# Patient Record
Sex: FEMALE | Race: WHITE | NOT HISPANIC OR LATINO | Employment: FULL TIME | URBAN - METROPOLITAN AREA
[De-identification: names, ages, dates, MRNs, and addresses within clinical notes are randomized per-mention and may not be internally consistent; named-entity substitution may affect disease eponyms.]

---

## 2017-02-02 ENCOUNTER — GENERIC CONVERSION - ENCOUNTER (OUTPATIENT)
Dept: OTHER | Facility: OTHER | Age: 32
End: 2017-02-02

## 2017-02-08 ENCOUNTER — ALLSCRIPTS OFFICE VISIT (OUTPATIENT)
Dept: OTHER | Facility: OTHER | Age: 32
End: 2017-02-08

## 2017-02-08 DIAGNOSIS — R35.89 OTHER POLYURIA: ICD-10-CM

## 2017-02-08 DIAGNOSIS — R53.83 OTHER FATIGUE: ICD-10-CM

## 2017-02-13 ENCOUNTER — ALLSCRIPTS OFFICE VISIT (OUTPATIENT)
Dept: OTHER | Facility: OTHER | Age: 32
End: 2017-02-13

## 2017-03-16 ENCOUNTER — ALLSCRIPTS OFFICE VISIT (OUTPATIENT)
Dept: OTHER | Facility: OTHER | Age: 32
End: 2017-03-16

## 2017-05-08 ENCOUNTER — LAB CONVERSION - ENCOUNTER (OUTPATIENT)
Dept: OTHER | Facility: OTHER | Age: 32
End: 2017-05-08

## 2017-05-08 LAB
CORTIS AM PEAK SERPL-SCNC: 14.2 MCG/DL
CREATININE 24 HOUR UR (HISTORICAL): 1.38 G/24 H (ref 0.63–2.5)

## 2017-05-09 ENCOUNTER — LAB CONVERSION - ENCOUNTER (OUTPATIENT)
Dept: OTHER | Facility: OTHER | Age: 32
End: 2017-05-09

## 2017-05-09 LAB
A/G RATIO (HISTORICAL): 1.3 (CALC) (ref 1–2.5)
ALBUMIN SERPL BCP-MCNC: 3.9 G/DL (ref 3.6–5.1)
ALP SERPL-CCNC: 57 U/L (ref 33–115)
ALT SERPL W P-5'-P-CCNC: 15 U/L (ref 6–29)
AST SERPL W P-5'-P-CCNC: 18 U/L (ref 10–30)
BILIRUB SERPL-MCNC: 1 MG/DL (ref 0.2–1.2)
BUN SERPL-MCNC: 10 MG/DL (ref 7–25)
BUN/CREA RATIO (HISTORICAL): NORMAL (CALC) (ref 6–22)
CALCIUM SERPL-MCNC: 8.9 MG/DL (ref 8.6–10.2)
CHLORIDE SERPL-SCNC: 105 MMOL/L (ref 98–110)
CO2 SERPL-SCNC: 23 MMOL/L (ref 20–31)
CREAT SERPL-MCNC: 0.85 MG/DL (ref 0.5–1.1)
EGFR AFRICAN AMERICAN (HISTORICAL): 107 ML/MIN/1.73M2
EGFR-AMERICAN CALC (HISTORICAL): 92 ML/MIN/1.73M2
GAMMA GLOBULIN (HISTORICAL): 2.9 G/DL (CALC) (ref 1.9–3.7)
GLUCOSE (HISTORICAL): 85 MG/DL (ref 65–99)
OSMOLALITY UR: 627 MOSM/KG H2O (ref 50–1400)
OSMOLALITY, SERUM (HISTORICAL): 285 MOSM/KG H2O (ref 275–295)
POTASSIUM SERPL-SCNC: 3.9 MMOL/L (ref 3.5–5.3)
SODIUM SERPL-SCNC: 137 MMOL/L (ref 135–146)
T4 FREE SERPL-MCNC: 1.3 NG/DL (ref 0.8–1.8)
TOTAL PROTEIN (HISTORICAL): 6.8 G/DL (ref 6.1–8.1)
TSH SERPL DL<=0.05 MIU/L-ACNC: 1.1 MIU/L

## 2017-05-11 ENCOUNTER — ALLSCRIPTS OFFICE VISIT (OUTPATIENT)
Dept: OTHER | Facility: OTHER | Age: 32
End: 2017-05-11

## 2017-08-11 ENCOUNTER — ALLSCRIPTS OFFICE VISIT (OUTPATIENT)
Dept: OTHER | Facility: OTHER | Age: 32
End: 2017-08-11

## 2017-08-11 DIAGNOSIS — H55.00 NYSTAGMUS: ICD-10-CM

## 2017-08-11 DIAGNOSIS — H53.9 VISUAL DISTURBANCE: ICD-10-CM

## 2017-08-16 ENCOUNTER — HOSPITAL ENCOUNTER (OUTPATIENT)
Dept: MRI IMAGING | Facility: HOSPITAL | Age: 32
Discharge: HOME | End: 2017-08-16
Payer: COMMERCIAL

## 2017-08-16 DIAGNOSIS — H53.9 VISUAL DISTURBANCE: ICD-10-CM

## 2017-08-16 DIAGNOSIS — H55.00 NYSTAGMUS: ICD-10-CM

## 2017-08-16 PROCEDURE — A9585 GADOBUTROL INJECTION: HCPCS | Performed by: RADIOLOGY

## 2017-08-16 PROCEDURE — 70553 MRI BRAIN STEM W/O & W/DYE: CPT

## 2017-08-16 RX ADMIN — GADOBUTROL 6 ML: 604.72 INJECTION INTRAVENOUS at 08:46

## 2017-08-17 ENCOUNTER — GENERIC CONVERSION - ENCOUNTER (OUTPATIENT)
Dept: OTHER | Facility: OTHER | Age: 32
End: 2017-08-17

## 2017-09-06 ENCOUNTER — ALLSCRIPTS OFFICE VISIT (OUTPATIENT)
Dept: OTHER | Facility: OTHER | Age: 32
End: 2017-09-06

## 2017-10-05 ENCOUNTER — GENERIC CONVERSION - ENCOUNTER (OUTPATIENT)
Dept: OTHER | Facility: OTHER | Age: 32
End: 2017-10-05

## 2017-10-11 ENCOUNTER — LAB REQUISITION (OUTPATIENT)
Dept: LAB | Facility: HOSPITAL | Age: 32
End: 2017-10-11
Payer: COMMERCIAL

## 2017-10-11 ENCOUNTER — GENERIC CONVERSION - ENCOUNTER (OUTPATIENT)
Dept: OTHER | Facility: OTHER | Age: 32
End: 2017-10-11

## 2017-10-11 DIAGNOSIS — R10.2 PELVIC AND PERINEAL PAIN: ICD-10-CM

## 2017-10-11 PROCEDURE — 87480 CANDIDA DNA DIR PROBE: CPT | Performed by: OBSTETRICS & GYNECOLOGY

## 2017-10-11 PROCEDURE — 87510 GARDNER VAG DNA DIR PROBE: CPT | Performed by: OBSTETRICS & GYNECOLOGY

## 2017-10-11 PROCEDURE — 87660 TRICHOMONAS VAGIN DIR PROBE: CPT | Performed by: OBSTETRICS & GYNECOLOGY

## 2017-10-11 PROCEDURE — 87591 N.GONORRHOEAE DNA AMP PROB: CPT | Performed by: OBSTETRICS & GYNECOLOGY

## 2017-10-11 PROCEDURE — 87491 CHLMYD TRACH DNA AMP PROBE: CPT | Performed by: OBSTETRICS & GYNECOLOGY

## 2017-10-12 LAB
CANDIDA RRNA VAG QL PROBE: NEGATIVE
CHLAMYDIA DNA CVX QL NAA+PROBE: ABNORMAL
G VAGINALIS RRNA GENITAL QL PROBE: NEGATIVE
N GONORRHOEA DNA GENITAL QL NAA+PROBE: ABNORMAL
T VAGINALIS RRNA GENITAL QL PROBE: NEGATIVE

## 2017-10-13 ENCOUNTER — GENERIC CONVERSION - ENCOUNTER (OUTPATIENT)
Dept: OTHER | Facility: OTHER | Age: 32
End: 2017-10-13

## 2017-10-16 ENCOUNTER — HOSPITAL ENCOUNTER (OUTPATIENT)
Dept: ULTRASOUND IMAGING | Facility: HOSPITAL | Age: 32
Discharge: HOME | End: 2017-10-16
Payer: COMMERCIAL

## 2017-10-16 DIAGNOSIS — R10.2 PELVIC AND PERINEAL PAIN: ICD-10-CM

## 2017-10-16 PROCEDURE — 76830 TRANSVAGINAL US NON-OB: CPT

## 2017-10-16 PROCEDURE — 76856 US EXAM PELVIC COMPLETE: CPT

## 2017-10-19 ENCOUNTER — HOSPITAL ENCOUNTER (EMERGENCY)
Facility: HOSPITAL | Age: 32
Discharge: HOME | End: 2017-10-20
Attending: EMERGENCY MEDICINE | Admitting: EMERGENCY MEDICINE
Payer: COMMERCIAL

## 2017-10-19 DIAGNOSIS — R10.32 LEFT LOWER QUADRANT ABDOMINAL PAIN OF UNKNOWN ETIOLOGY: Primary | ICD-10-CM

## 2017-10-19 LAB
ALBUMIN SERPL BCP-MCNC: 3.5 G/DL (ref 3.5–5)
ALP SERPL-CCNC: 69 U/L (ref 46–116)
ALT SERPL W P-5'-P-CCNC: 35 U/L (ref 12–78)
ANION GAP SERPL CALCULATED.3IONS-SCNC: 11 MMOL/L (ref 4–13)
AST SERPL W P-5'-P-CCNC: 31 U/L (ref 5–45)
BACTERIA UR QL AUTO: ABNORMAL /HPF
BASOPHILS # BLD AUTO: 0.03 THOUSANDS/ΜL (ref 0–0.1)
BASOPHILS NFR BLD AUTO: 0 % (ref 0–1)
BILIRUB SERPL-MCNC: 0.4 MG/DL (ref 0.2–1)
BILIRUB UR QL STRIP: NEGATIVE
BUN SERPL-MCNC: 14 MG/DL (ref 5–25)
CALCIUM SERPL-MCNC: 8.9 MG/DL (ref 8.3–10.1)
CHLORIDE SERPL-SCNC: 103 MMOL/L (ref 100–108)
CLARITY UR: ABNORMAL
CO2 SERPL-SCNC: 24 MMOL/L (ref 21–32)
COLOR UR: YELLOW
CREAT SERPL-MCNC: 0.83 MG/DL (ref 0.6–1.3)
EOSINOPHIL # BLD AUTO: 0.13 THOUSAND/ΜL (ref 0–0.61)
EOSINOPHIL NFR BLD AUTO: 1 % (ref 0–6)
ERYTHROCYTE [DISTWIDTH] IN BLOOD BY AUTOMATED COUNT: 13.3 % (ref 11.6–15.1)
EXT PREG TEST URINE: NEGATIVE
GFR SERPL CREATININE-BSD FRML MDRD: 95 ML/MIN/1.73SQ M
GLUCOSE SERPL-MCNC: 107 MG/DL (ref 65–140)
GLUCOSE UR STRIP-MCNC: NEGATIVE MG/DL
HCT VFR BLD AUTO: 43.2 % (ref 34.8–46.1)
HGB BLD-MCNC: 14.9 G/DL (ref 11.5–15.4)
HGB UR QL STRIP.AUTO: ABNORMAL
KETONES UR STRIP-MCNC: NEGATIVE MG/DL
LEUKOCYTE ESTERASE UR QL STRIP: NEGATIVE
LIPASE SERPL-CCNC: 243 U/L (ref 73–393)
LYMPHOCYTES # BLD AUTO: 3.81 THOUSANDS/ΜL (ref 0.6–4.47)
LYMPHOCYTES NFR BLD AUTO: 32 % (ref 14–44)
MCH RBC QN AUTO: 31.8 PG (ref 26.8–34.3)
MCHC RBC AUTO-ENTMCNC: 34.5 G/DL (ref 31.4–37.4)
MCV RBC AUTO: 92 FL (ref 82–98)
MONOCYTES # BLD AUTO: 0.57 THOUSAND/ΜL (ref 0.17–1.22)
MONOCYTES NFR BLD AUTO: 5 % (ref 4–12)
MUCOUS THREADS UR QL AUTO: ABNORMAL
NEUTROPHILS # BLD AUTO: 7.4 THOUSANDS/ΜL (ref 1.85–7.62)
NEUTS SEG NFR BLD AUTO: 62 % (ref 43–75)
NITRITE UR QL STRIP: NEGATIVE
NON-SQ EPI CELLS URNS QL MICRO: ABNORMAL /HPF
PH UR STRIP.AUTO: 5.5 [PH] (ref 4.5–8)
PLATELET # BLD AUTO: 287 THOUSANDS/UL (ref 149–390)
PMV BLD AUTO: 10.3 FL (ref 8.9–12.7)
POTASSIUM SERPL-SCNC: 3.2 MMOL/L (ref 3.5–5.3)
PROT SERPL-MCNC: 7 G/DL (ref 6.4–8.2)
PROT UR STRIP-MCNC: NEGATIVE MG/DL
RBC # BLD AUTO: 4.69 MILLION/UL (ref 3.81–5.12)
RBC #/AREA URNS AUTO: ABNORMAL /HPF
SODIUM SERPL-SCNC: 138 MMOL/L (ref 136–145)
SP GR UR STRIP.AUTO: >=1.03 (ref 1–1.03)
UROBILINOGEN UR QL STRIP.AUTO: 0.2 E.U./DL
WBC # BLD AUTO: 11.94 THOUSAND/UL (ref 4.31–10.16)
WBC #/AREA URNS AUTO: ABNORMAL /HPF

## 2017-10-19 PROCEDURE — 85025 COMPLETE CBC W/AUTO DIFF WBC: CPT | Performed by: EMERGENCY MEDICINE

## 2017-10-19 PROCEDURE — 81025 URINE PREGNANCY TEST: CPT | Performed by: EMERGENCY MEDICINE

## 2017-10-19 PROCEDURE — 36415 COLL VENOUS BLD VENIPUNCTURE: CPT

## 2017-10-19 PROCEDURE — 83690 ASSAY OF LIPASE: CPT | Performed by: EMERGENCY MEDICINE

## 2017-10-19 PROCEDURE — 81001 URINALYSIS AUTO W/SCOPE: CPT | Performed by: EMERGENCY MEDICINE

## 2017-10-19 PROCEDURE — 80053 COMPREHEN METABOLIC PANEL: CPT | Performed by: EMERGENCY MEDICINE

## 2017-10-20 ENCOUNTER — APPOINTMENT (EMERGENCY)
Dept: CT IMAGING | Facility: HOSPITAL | Age: 32
End: 2017-10-20
Payer: COMMERCIAL

## 2017-10-20 VITALS
WEIGHT: 152.3 LBS | HEART RATE: 82 BPM | TEMPERATURE: 97.2 F | HEIGHT: 65 IN | RESPIRATION RATE: 18 BRPM | BODY MASS INDEX: 25.37 KG/M2 | SYSTOLIC BLOOD PRESSURE: 128 MMHG | DIASTOLIC BLOOD PRESSURE: 71 MMHG | OXYGEN SATURATION: 98 %

## 2017-10-20 PROCEDURE — 99284 EMERGENCY DEPT VISIT MOD MDM: CPT

## 2017-10-20 PROCEDURE — 96361 HYDRATE IV INFUSION ADD-ON: CPT

## 2017-10-20 PROCEDURE — 74177 CT ABD & PELVIS W/CONTRAST: CPT

## 2017-10-20 PROCEDURE — 96374 THER/PROPH/DIAG INJ IV PUSH: CPT

## 2017-10-20 PROCEDURE — 96375 TX/PRO/DX INJ NEW DRUG ADDON: CPT

## 2017-10-20 RX ORDER — ONDANSETRON 2 MG/ML
4 INJECTION INTRAMUSCULAR; INTRAVENOUS ONCE
Status: COMPLETED | OUTPATIENT
Start: 2017-10-20 | End: 2017-10-20

## 2017-10-20 RX ORDER — NAPROXEN 500 MG/1
500 TABLET ORAL 2 TIMES DAILY PRN
Qty: 30 TABLET | Refills: 0 | Status: SHIPPED | OUTPATIENT
Start: 2017-10-20 | End: 2018-04-15 | Stop reason: ALTCHOICE

## 2017-10-20 RX ORDER — KETOROLAC TROMETHAMINE 30 MG/ML
10 INJECTION, SOLUTION INTRAMUSCULAR; INTRAVENOUS ONCE
Status: COMPLETED | OUTPATIENT
Start: 2017-10-20 | End: 2017-10-20

## 2017-10-20 RX ADMIN — ONDANSETRON 4 MG: 2 INJECTION INTRAMUSCULAR; INTRAVENOUS at 00:38

## 2017-10-20 RX ADMIN — IOHEXOL 100 ML: 350 INJECTION, SOLUTION INTRAVENOUS at 01:00

## 2017-10-20 RX ADMIN — SODIUM CHLORIDE 1000 ML: 0.9 INJECTION, SOLUTION INTRAVENOUS at 00:38

## 2017-10-20 RX ADMIN — KETOROLAC TROMETHAMINE 9.9 MG: 30 INJECTION, SOLUTION INTRAMUSCULAR at 00:37

## 2017-10-20 NOTE — ED PROVIDER NOTES
History  Chief Complaint   Patient presents with    Abdominal Pain     Pt has had ongoing LLQ and has been following up with OBGYN  2 weeks ago pt states she had merena placed, friday started tx for clamydia and monday had pelvic US  Pt became concerned when vaginal discharged changed colors today  History provided by:  Patient and parent  Abdominal Pain   Pain location:  LLQ  Pain quality: cramping    Pain radiates to:  L leg  Pain severity:  Moderate  Onset quality:  Gradual  Duration:  14 days  Timing:  Intermittent  Progression:  Waxing and waning  Chronicity:  New  Context: recent illness    Context: not alcohol use, not awakening from sleep, not diet changes, not eating, not previous surgeries, not retching, not sick contacts, not suspicious food intake and not trauma    Relieved by:  Nothing  Worsened by:  Nothing  Ineffective treatments:  None tried  Associated symptoms: constipation, nausea and vaginal discharge (Reports scant white vaginal discharge beginning today )    Associated symptoms: no anorexia, no chest pain, no chills, no cough, no diarrhea, no dysuria, no fatigue, no fever, no hematuria, no shortness of breath, no vaginal bleeding and no vomiting    Risk factors: has not had multiple surgeries (No hx GI surgery  Has had previous LEAP surgery  No hx STI ), no NSAID use, not obese, not pregnant and no recent hospitalization    Risk factors comment:  IUD placed 14d prior by OB-gyn; treated by Ob-gyn 7d prior with azithromycin x1 dose d/t positive chlamydia test  Had pelvic US on 16 Oct d/t ongoing LLQ abdominal pain that was wnl  DDx including but not limited to: appendicitis, gastroenteritis, gastritis, PUD, GERD, hepatitis, pancreatitis, colitis, enteritis, diverticulitis, food poisoning, mesenteric adenitis, mesenteric ischemia, IBD, IBS, ileus, bowel obstruction, volvulus, cholecystitis, biliary colic, renal colic, pyelonephritis, UTI   Labs drawn prior to my evaluation are unremakrable apart from mildly elevated WBC count; as patient has had normal pelvic US 4d prior in setting of identical sx, my concern for pelvic pathology is relatively lower and concern for primary GI pathology higher  Pelvic ddx includes PID/TOA/IUD malposition/procedure-related uterine irritation  Will obtain CT a/p for any GI abnormality and give symptomatic therapy while workup ongoing  Prior to Admission Medications   Prescriptions Last Dose Informant Patient Reported? Taking?   levonorgestrel (MIRENA) 20 MCG/24HR IUD   Yes Yes   Si each by Intrauterine route once      Facility-Administered Medications: None       Past Medical History:   Diagnosis Date    Irritable bowel        Past Surgical History:   Procedure Laterality Date    CERVICAL BIOPSY  W/ LOOP ELECTRODE EXCISION         History reviewed  No pertinent family history  I have reviewed and agree with the history as documented  Social History   Substance Use Topics    Smoking status: Current Every Day Smoker    Smokeless tobacco: Never Used    Alcohol use Yes      Comment: ocassional        Review of Systems   Constitutional: Negative for chills, fatigue and fever  Respiratory: Negative for cough and shortness of breath  Cardiovascular: Negative for chest pain and palpitations  Gastrointestinal: Positive for abdominal pain, constipation and nausea  Negative for anorexia, diarrhea and vomiting  Genitourinary: Positive for vaginal discharge (Reports scant white vaginal discharge beginning today  )  Negative for difficulty urinating, dysuria, flank pain, frequency, hematuria, urgency and vaginal bleeding  Skin: Negative for color change, pallor, rash and wound  Neurological: Negative for dizziness, weakness, numbness and headaches  Hematological: Negative for adenopathy  Does not bruise/bleed easily  All other systems reviewed and are negative        Physical Exam  ED Triage Vitals [10/19/17 2223]   Temperature Pulse Respirations Blood Pressure SpO2   (!) 97 2 °F (36 2 °C) 83 18 132/71 97 %      Temp Source Heart Rate Source Patient Position - Orthostatic VS BP Location FiO2 (%)   Temporal Monitor Sitting Left arm --      Pain Score       3           Physical Exam   Constitutional: She is oriented to person, place, and time  She appears well-developed and well-nourished  She is cooperative  No distress  HENT:   Head: Normocephalic and atraumatic  Right Ear: Hearing and external ear normal    Left Ear: Hearing and external ear normal    Nose: Nose normal    Mouth/Throat: Uvula is midline, oropharynx is clear and moist and mucous membranes are normal  No oropharyngeal exudate  Eyes: Conjunctivae, EOM and lids are normal  Pupils are equal, round, and reactive to light  Neck: Trachea normal, normal range of motion and phonation normal  Neck supple  No JVD present  No tracheal tenderness, no spinous process tenderness and no muscular tenderness present  No tracheal deviation present  No thyroid mass and no thyromegaly present  Cardiovascular: Normal rate, regular rhythm, S1 normal, S2 normal, normal heart sounds and intact distal pulses  Exam reveals no gallop and no friction rub  No murmur heard  Pulses:       Radial pulses are 2+ on the right side, and 2+ on the left side  Dorsalis pedis pulses are 2+ on the right side, and 2+ on the left side  Posterior tibial pulses are 2+ on the right side, and 2+ on the left side  Pulmonary/Chest: Effort normal and breath sounds normal  No stridor  No respiratory distress  She has no decreased breath sounds  She has no wheezes  She has no rhonchi  She has no rales  She exhibits no tenderness  Abdominal: Soft  She exhibits no distension and no mass  There is no tenderness  There is no rigidity, no rebound, no guarding and no CVA tenderness  Genitourinary: Uterus normal  Pelvic exam was performed with patient supine   There is no rash, tenderness, lesion or injury on the right labia  There is no rash, tenderness, lesion or injury on the left labia  Cervix exhibits no motion tenderness (IUD string visible projecting from cervix), no discharge and no friability  Right adnexum displays no mass, no tenderness and no fullness  Left adnexum displays no mass, no tenderness and no fullness  There is bleeding (Scant dried blood in upper vaginal vault  There is no active bleeding present from cervix or any other portion of vagina ) in the vagina  No erythema or tenderness in the vagina  No foreign body in the vagina  No signs of injury around the vagina  No vaginal discharge found  Genitourinary Comments: Exam assisted by Thuy Ty RN   Musculoskeletal: Normal range of motion  She exhibits no edema, tenderness or deformity  Lymphadenopathy:     She has no cervical adenopathy  Neurological: She is alert and oriented to person, place, and time  She has normal strength  No cranial nerve deficit or sensory deficit  She exhibits normal muscle tone  GCS eye subscore is 4  GCS verbal subscore is 5  GCS motor subscore is 6  PERRLA; EOMI  Sensation intact to light touch over face in V1-V3 distribution bilaterally  Facial expressions symmetric  Tongue/uvula midline  Shoulder shrug equal bilaterally  Strength 5/5 in UE/LE bilaterally  Sensation intact to light touch in UE/LE bilaterally  Skin: Skin is warm, dry and intact  No rash noted  No erythema  Psychiatric: She has a normal mood and affect  Her speech is normal and behavior is normal    Nursing note and vitals reviewed        ED Medications  Medications   ketorolac (TORADOL) 30 mg/mL injection 9 9 mg (9 9 mg Intravenous Given 10/20/17 0037)   sodium chloride 0 9 % bolus 1,000 mL (0 mL Intravenous Stopped 10/20/17 0157)   ondansetron (ZOFRAN) injection 4 mg (4 mg Intravenous Given 10/20/17 0038)   iohexol (OMNIPAQUE) 350 MG/ML injection (SINGLE-DOSE) 100 mL (100 mL Intravenous Given 10/20/17 0100)       Diagnostic Studies  Labs Reviewed   UA W REFLEX TO MICROSCOPIC WITH REFLEX TO CULTURE - Abnormal        Result Value Ref Range Status    Blood, UA Moderate (*) Negative, Trace-Intact Final    Color, UA Yellow   Final    Clarity, UA Slightly Cloudy   Final    Specific Gravity, UA >=1 030  1 003 - 1 030 Final    pH, UA 5 5  4 5 - 8 0 Final    Leukocytes, UA Negative  Negative Final    Nitrite, UA Negative  Negative Final    Protein, UA Negative  Negative mg/dl Final    Glucose, UA Negative  Negative mg/dl Final    Ketones, UA Negative  Negative mg/dl Final    Urobilinogen, UA 0 2  0 2, 1 0 E U /dl E U /dl Final    Bilirubin, UA Negative  Negative Final   CBC AND DIFFERENTIAL - Abnormal     WBC 11 94 (*) 4 31 - 10 16 Thousand/uL Final    RBC 4 69  3 81 - 5 12 Million/uL Final    Hemoglobin 14 9  11 5 - 15 4 g/dL Final    Hematocrit 43 2  34 8 - 46 1 % Final    MCV 92  82 - 98 fL Final    MCH 31 8  26 8 - 34 3 pg Final    MCHC 34 5  31 4 - 37 4 g/dL Final    RDW 13 3  11 6 - 15 1 % Final    MPV 10 3  8 9 - 12 7 fL Final    Platelets 696  251 - 390 Thousands/uL Final    Neutrophils Relative 62  43 - 75 % Final    Lymphocytes Relative 32  14 - 44 % Final    Monocytes Relative 5  4 - 12 % Final    Eosinophils Relative 1  0 - 6 % Final    Basophils Relative 0  0 - 1 % Final    Neutrophils Absolute 7 40  1 85 - 7 62 Thousands/µL Final    Lymphocytes Absolute 3 81  0 60 - 4 47 Thousands/µL Final    Monocytes Absolute 0 57  0 17 - 1 22 Thousand/µL Final    Eosinophils Absolute 0 13  0 00 - 0 61 Thousand/µL Final    Basophils Absolute 0 03  0 00 - 0 10 Thousands/µL Final   COMPREHENSIVE METABOLIC PANEL - Abnormal     Potassium 3 2 (*) 3 5 - 5 3 mmol/L Final    Sodium 138  136 - 145 mmol/L Final    Chloride 103  100 - 108 mmol/L Final    CO2 24  21 - 32 mmol/L Final    Anion Gap 11  4 - 13 mmol/L Final    BUN 14  5 - 25 mg/dL Final    Creatinine 0 83  0 60 - 1 30 mg/dL Final    Comment: Standardized to IDMS reference method    Glucose 107 65 - 140 mg/dL Final    Comment:   If the patient is fasting, the ADA then defines impaired fasting glucose as > 100 mg/dL and diabetes as > or equal to 123 mg/dL  Specimen collection should occur prior to Sulfasalazine administration due to the potential for falsely depressed results  Specimen collection should occur prior to Sulfapyridine administration due to the potential for falsely elevated results  Calcium 8 9  8 3 - 10 1 mg/dL Final    AST 31  5 - 45 U/L Final    Comment:   Specimen collection should occur prior to Sulfasalazine administration due to the potential for falsely depressed results  ALT 35  12 - 78 U/L Final    Comment:   Specimen collection should occur prior to Sulfasalazine administration due to the potential for falsely depressed results  Alkaline Phosphatase 69  46 - 116 U/L Final    Total Protein 7 0  6 4 - 8 2 g/dL Final    Albumin 3 5  3 5 - 5 0 g/dL Final    Total Bilirubin 0 40  0 20 - 1 00 mg/dL Final    eGFR 95  ml/min/1 73sq m Final    Narrative:     National Kidney Disease Education Program recommendations are as follows:  GFR calculation is accurate only with a steady state creatinine  Chronic Kidney disease less than 60 ml/min/1 73 sq  meters  Kidney failure less than 15 ml/min/1 73 sq  meters  URINE MICROSCOPIC - Abnormal     RBC, UA 4-10 (*) None Seen, 0-5 /hpf Final    Epithelial Cells Moderate (*) None Seen, Occasional /hpf Final    Bacteria, UA Moderate (*) None Seen, Occasional /hpf Final    WBC, UA None Seen  None Seen, 0-5, 5-55, 5-65 /hpf Final    MUCOUS THREADS Moderate  Occasional, Moderate, Innumerable Final   LIPASE - Normal    Lipase 243  73 - 393 u/L Final   POCT PREGNANCY, URINE - Normal    EXT PREG TEST UR (Ref: Negative) negative   Final       CT abdomen pelvis with contrast   ED Interpretation   Reviewed virtual radiology report:      IMPRESSION:   1  Right adnexal cyst redemonstrated  2  Trace free pelvic fluid     3  No other acute abnormality identified  Procedures  Procedures      Phone Contacts  ED Phone Contact    ED Course  ED Course as of Oct 20 0526   Fri Oct 20, 2017   0113 1  WBC minimally elevated  2  Hg/Hct wnl   3  Plt wnl   4  Electrolytes wnl  LFTs wnl   5  Lipase wnl   6  UA shows moderate blood; no other abnormalities present  7  UPT negative  CT completed and awaiting interpretation at this time  MDM  Number of Diagnoses or Management Options  Left lower quadrant abdominal pain of unknown etiology: new and requires workup     Amount and/or Complexity of Data Reviewed  Clinical lab tests: reviewed and ordered  Tests in the radiology section of CPT®: ordered and reviewed  Decide to obtain previous medical records or to obtain history from someone other than the patient: yes  Obtain history from someone other than the patient: yes (Patient's mother accompanies her to ED)  Review and summarize past medical records: yes  Independent visualization of images, tracings, or specimens: yes    Risk of Complications, Morbidity, and/or Mortality  Presenting problems: high  Diagnostic procedures: high  Management options: moderate  General comments: CT scan unremarkable as to etiology of patient's symptoms; there is a right-sided adnexal structure reported when compared to previous pelvic US of 16 October  I reviewed the previous US, however, and no adnexal structure is present on the right (there is a dominant right-sided follicle noted)  This is not consistent with location of patient's sx regardless  IUD is present in appropriate position regardless  Pelvic exam not c/w continued PID or cervicitis  Encouraged patient to follow with primary physician for further evaluation  NSAID for symptomatic therapy in the interval  All questions answered prior to discharge  The patient and her mother expressed understanding and agreed to plan      Patient Progress  Patient progress: improved    CritCare Time    Disposition  Final diagnoses: Left lower quadrant abdominal pain of unknown etiology     ED Disposition     ED Disposition Condition Comment    Discharge  Doreen Wesley discharge to home/self care  Condition at discharge: Good        Follow-up Information     Follow up With Specialties Details Why [address],  Family Medicine Call today For an appointment for further evaluation [address] [address]  [address], [facility] [address] [phone]          Discharge Medication List as of 10/20/2017  1:55 AM      START taking these medications    Details   naproxen (NAPROSYN) 500 mg tablet Take 1 tablet by mouth 2 (two) times a day as needed for mild pain, Starting Fri 10/20/2017, Print         CONTINUE these medications which have NOT CHANGED    Details   levonorgestrel (MIRENA) 20 MCG/24HR IUD 1 each by Intrauterine route once, Historical Med           No discharge procedures on file      ED Provider  Electronically Signed by       Marieta Osgood, DO  10/20/17 5006

## 2017-10-20 NOTE — DISCHARGE INSTRUCTIONS
Abdominal Pain   WHAT YOU NEED TO KNOW:   Abdominal pain can be dull, achy, or sharp  You may have pain in one area of your abdomen, or in your entire abdomen  Your pain may be caused by a condition such as constipation, food sensitivity or poisoning, infection, or a blockage  Abdominal pain can also be from a hernia, appendicitis, or an ulcer  Liver, gallbladder, or kidney conditions can also cause abdominal pain  The cause of your abdominal pain may be unknown  DISCHARGE INSTRUCTIONS:   Return to the emergency department if:   · You have new chest pain or shortness of breath  · You have pulsing pain in your upper abdomen or lower back that suddenly becomes constant  · Your pain is in the right lower abdominal area and worsens with movement  · You have a fever over 100 4°F (38°C) or shaking chills  · You are vomiting and cannot keep food or liquids down  · Your pain does not improve or gets worse over the next 8 to 12 hours  · You see blood in your vomit or bowel movements, or they look black and tarry  · Your skin or the whites of your eyes turn yellow  · You are a woman and have a large amount of vaginal bleeding that is not your monthly period  Contact your healthcare provider if:   · You have pain in your lower back  · You are a man and have pain in your testicles  · You have pain when you urinate  · You have questions or concerns about your condition or care  Follow up with your healthcare provider within 24 hours or as directed:  Write down your questions so you remember to ask them during your visits  Medicines:   · Medicines  may be given to calm your stomach and prevent vomiting or to decrease pain  Ask how to take pain medicine safely  · Take your medicine as directed  Contact your healthcare provider if you think your medicine is not helping or if you have side effects  Tell him of her if you are allergic to any medicine   Keep a list of the medicines, vitamins, and herbs you take  Include the amounts, and when and why you take them  Bring the list or the pill bottles to follow-up visits  Carry your medicine list with you in case of an emergency  © 2017 [employer] [employer] Information is for End User's use only and may not be sold, redistributed or otherwise used for commercial purposes  All illustrations and images included in CareNotes® are the copyrighted property of A D A M , Inc  or Nhan Carver  The above information is an  only  It is not intended as medical advice for individual conditions or treatments  Talk to your doctor, nurse or pharmacist before following any medical regimen to see if it is safe and effective for you

## 2017-10-30 ENCOUNTER — GENERIC CONVERSION - ENCOUNTER (OUTPATIENT)
Dept: OTHER | Facility: OTHER | Age: 32
End: 2017-10-30

## 2017-11-01 ENCOUNTER — GENERIC CONVERSION - ENCOUNTER (OUTPATIENT)
Dept: OTHER | Facility: OTHER | Age: 32
End: 2017-11-01

## 2017-11-01 ENCOUNTER — LAB REQUISITION (OUTPATIENT)
Dept: LAB | Facility: HOSPITAL | Age: 32
End: 2017-11-01
Payer: COMMERCIAL

## 2017-11-01 DIAGNOSIS — N89.8 OTHER SPECIFIED NONINFLAMMATORY DISORDERS OF VAGINA (CODE): ICD-10-CM

## 2017-11-01 DIAGNOSIS — A56.00 INFECTION OF LOWER GENITOURINARY TRACT DUE TO CHLAMYDIA: ICD-10-CM

## 2017-11-01 PROCEDURE — 87660 TRICHOMONAS VAGIN DIR PROBE: CPT | Performed by: PHYSICIAN ASSISTANT

## 2017-11-01 PROCEDURE — 87510 GARDNER VAG DNA DIR PROBE: CPT | Performed by: PHYSICIAN ASSISTANT

## 2017-11-01 PROCEDURE — 87480 CANDIDA DNA DIR PROBE: CPT | Performed by: PHYSICIAN ASSISTANT

## 2017-11-01 PROCEDURE — 87591 N.GONORRHOEAE DNA AMP PROB: CPT | Performed by: PHYSICIAN ASSISTANT

## 2017-11-01 PROCEDURE — 87491 CHLMYD TRACH DNA AMP PROBE: CPT | Performed by: PHYSICIAN ASSISTANT

## 2017-11-03 LAB
CHLAMYDIA DNA CVX QL NAA+PROBE: NORMAL
N GONORRHOEA DNA GENITAL QL NAA+PROBE: NORMAL

## 2017-11-04 LAB
CANDIDA RRNA VAG QL PROBE: NEGATIVE
G VAGINALIS RRNA GENITAL QL PROBE: NEGATIVE
T VAGINALIS RRNA GENITAL QL PROBE: NEGATIVE

## 2017-11-06 ENCOUNTER — GENERIC CONVERSION - ENCOUNTER (OUTPATIENT)
Dept: OTHER | Facility: OTHER | Age: 32
End: 2017-11-06

## 2017-11-17 ENCOUNTER — GENERIC CONVERSION - ENCOUNTER (OUTPATIENT)
Dept: OTHER | Facility: OTHER | Age: 32
End: 2017-11-17

## 2017-11-30 ENCOUNTER — LAB REQUISITION (OUTPATIENT)
Dept: LAB | Facility: HOSPITAL | Age: 32
End: 2017-11-30
Payer: COMMERCIAL

## 2017-11-30 ENCOUNTER — GENERIC CONVERSION - ENCOUNTER (OUTPATIENT)
Dept: OTHER | Facility: OTHER | Age: 32
End: 2017-11-30

## 2017-11-30 DIAGNOSIS — N89.8 OTHER SPECIFIED NONINFLAMMATORY DISORDERS OF VAGINA (CODE): ICD-10-CM

## 2017-11-30 PROCEDURE — 87510 GARDNER VAG DNA DIR PROBE: CPT | Performed by: PHYSICIAN ASSISTANT

## 2017-11-30 PROCEDURE — 87660 TRICHOMONAS VAGIN DIR PROBE: CPT | Performed by: PHYSICIAN ASSISTANT

## 2017-11-30 PROCEDURE — 87480 CANDIDA DNA DIR PROBE: CPT | Performed by: PHYSICIAN ASSISTANT

## 2018-01-10 NOTE — MISCELLANEOUS
Message   Recorded as Task   Date: 10/13/2017 12:14 PM, Created By: Tanesha Wynn   Task Name: Care Coordination   Assigned To: Oc Lynn   Regarding Patient: Katey Mcgovern, Status: In Progress   Comment:    Soila Sosa - 13 Oct 2017 12:14 PM     TASK CREATED  Can you let Krystin Ellis know that unfortunately her chlamydia test was positive  She'll need 1g azithromycin sent to her pharmacy  The rest of her testing was negative  Vickie Blake - 13 Oct 2017 12:54 PM     TASK IN PROGRESS   Vickie Blake - 13 Oct 2017 1:40 PM     TASK EDITED  Phone call made to Pt today  Pt informed that Dr Duglas Simon reviewed Pt's recent test results, Pt's chlamydia test was positive, BV test negative  Prescription for Azithromycin 250 mg oral tablet, 4 tablets  0 refill completed in [employer] by Dr Duglas Simon  Prescription forwarded to pharmacy in Pt's EHR  Pt informed that sexual partner must be treated as well in order to prevent re-infection  Pt further informed that she should abstain from sex for at least two weeks while she and partner undergo treatment  Pt informed that a "test of cure" second test will need to be done 3 weeks post treatment  Reiterated to Pt that if she has any questions/concerns, to contact office  Active Problems    1  Chlamydial infection of lower genitourinary tract (099 53) (A56 00)   2  Encounter for general counseling and advice on contraceptive management (V25 09)   (Z30 09)   3  Encounter for insertion of mirena IUD (V25 11) (Z30 430)   4  Nystagmus (379 50) (H55 00)   5  Pelvic pain in female (625 9) (R10 2)   6  Tobacco use (305 1) (Z72 0)   7  Vaginal discharge (623 5) (N89 8)   8  Vision changes (368 9) (H53 9)    Current Meds   1  Azithromycin 250 MG Oral Tablet; TAKE 4  TABLET Oral;   Therapy: [id number] to (Last Rx:13Oct2017)  Requested for: [id number] Ordered   2  Chantix Continuing Month Marlon 1 MG Oral Tablet; TAKE 1 TABLET TWICE DAILY;    Therapy: [id number] to (EHIZCK:[id number])  Requested for: 11Aug2017; Last   Rx:11Aug2017 Ordered   3  Chantix Starting Month Marlon 0 5 MG X 11 & 1 MG X 42 Oral Tablet; TAKE AS DIRECTED   PER PACKAGE INSTRUCTIONS; Therapy: 11Aug2017 to (Last Rx:11Aug2017)  Requested for: 11Aug2017 Ordered    Allergies    1   No Known Drug Allergies    Signatures   Electronically signed by : Louis Vincent MA; Oct 13 2017  1:40PM EST                       (Author)

## 2018-01-11 NOTE — MISCELLANEOUS
Message   Recorded as Task   Date: 10/13/2017 12:14 PM, Created By: [address]   Task Name: Care Coordination   Assigned To: Jeanne Padron   Regarding Patient: Sebastián Bang, Status: In Progress   Comment:    Soila Sosa - 13 Oct 2017 12:14 PM     TASK CREATED  Can you let Juan Chery know that unfortunately her chlamydia test was positive  She'll need 1g azithromycin sent to her pharmacy  The rest of her testing was negative  Vickie Blake - 13 Oct 2017 12:54 PM     TASK IN PROGRESS   Vickie Blake - 13 Oct 2017 1:40 PM     TASK EDITED  Phone call made to Pt today  Pt informed that Dr Kaelyn Hernandez reviewed Pt's recent test results, Pt's chlamydia test was positive, BV test negative  Prescription for Azithromycin 250 mg oral tablet, 4 tablets  0 refill completed in [employer] by Dr Kaelyn Hernandez  Prescription forwarded to pharmacy in Pt's EHR  Pt informed that sexual partner must be treated as well in order to prevent re-infection  Pt further informed that she should abstain from sex for at least two weeks while she and partner undergo treatment  Pt informed that a "test of cure" second test will need to be done 3 weeks post treatment  Reiterated to Pt that if she has any questions/concerns, to contact office  Vickie Blake - 13 Oct 2017 1:42 PM     TASK EDITED   Pamela Ruiz - 17 Nov 2017 8:05 AM     TASK IN PROGRESS        Active Problems    1  History of Chlamydial infection of lower genitourinary tract (099 53) (A56 00)   2  Encounter for general counseling and advice on contraceptive management (V25 09)   (Z30 09)   3  Encounter for insertion of mirena IUD (V25 11) (Z30 430)   4  IUD (intrauterine device) in place (V45 51) (Z97 5)   5  Nystagmus (379 50) (H55 00)   6  Pelvic pain in female (625 9) (R10 2)   7  Tobacco use (305 1) (Z72 0)   8  Vaginal discharge (623 5) (N89 8)   9  Vision changes (368 9) (H53 9)    Current Meds   1   Chantix Continuing Month Marlon 1 MG Oral Tablet; TAKE 1 TABLET TWICE DAILY; Therapy: 11Aug2017 to (Evaluate:26Jan2018)  Requested for: 11Aug2017; Last   Rx:11Aug2017 Ordered   2  Chantix Starting Month Marlon 0 5 MG X 11 & 1 MG X 42 Oral Tablet; TAKE AS DIRECTED   PER PACKAGE INSTRUCTIONS; Therapy: 11Aug2017 to (Last Rx:11Aug2017)  Requested for: 11Aug2017 Ordered   3  MetroNIDAZOLE 500 MG Oral Tablet (Flagyl); Take 1 capsule twice daily; Therapy: [id number] to (Bib Timmons)  Requested for: [id number]; Last   Rx:30Oct2017 Ordered    Allergies    1   No Known Drug Allergies    Signatures   Electronically signed by : Nathanael Sykes, ; Nov 17 2017  8:06AM EST                       (Author)

## 2018-01-12 VITALS
WEIGHT: 145.44 LBS | HEIGHT: 64 IN | HEART RATE: 76 BPM | BODY MASS INDEX: 24.83 KG/M2 | DIASTOLIC BLOOD PRESSURE: 66 MMHG | SYSTOLIC BLOOD PRESSURE: 110 MMHG

## 2018-01-12 NOTE — MISCELLANEOUS
Message   Recorded as Task   Date: 02/01/2017 05:38 PM, Created By: Lidia Roth   Task Name: Med Renewal Request   Assigned To: Leonel Mckeon   Regarding Patient: Shyam Lee, Status: Active   CommentConstantine Furlong - 01 Feb 2017 5:38 PM     TASK CREATED  Caller: Self; Renew Medication; [phone] (Home); [phone] (Work)  Patient needs a refill before her  depo on 2/13/17  She wants it to go to a new pharmacy  [facility] [address] in [address] [place] [address] Feb 2017 7:37 AM     TASK REASSIGNED: Previously Assigned To James Toussaint - 88 Feb 2017 8:01 AM     TASK EDITED  rx to pharm        Active Problems    1  Acute conjunctivitis of right eye (372 00) (H10 31)   2  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)   3  Encounter for gynecological examination without abnormal finding (V72 31) (Z01 419)   4  Hypotension (458 9) (I95 9)   5  Orthostasis (458 0) (I95 1)   6  Palpitations (785 1) (R00 2)   7  Pelvic pain in female (625 9) (R10 2)    Current Meds   1  Desmopressin Acetate 0 1 MG Oral Tablet; TAKE 1 TABLET ONCE DAILY; Therapy: [id number] to (Evaluate:26Jan2017)  Requested for: 97Wxd1277; Last   Rx:[id number] Ordered   2  MedroxyPROGESTERone Acetate 150 MG/ML Intramuscular Suspension; INJECT   EVERY 12 WEEKS AS DIRECTED; Therapy: [id number] to (Last Rx:16Nov2016)  Requested for: [id number] Ordered    Allergies    1   No Known Drug Allergies    Plan  Encounter for gynecological examination without abnormal finding    · MedroxyPROGESTERone Acetate 150 MG/ML Intramuscular Suspension  (Depo-Provera); INJECT EVERY 12 WEEKS AS DIRECTED    Signatures   Electronically signed by : Atif Epps, ; Feb 2 2017  8:01AM EST                       (Author)

## 2018-01-13 VITALS — SYSTOLIC BLOOD PRESSURE: 98 MMHG | DIASTOLIC BLOOD PRESSURE: 54 MMHG | HEART RATE: 80 BPM

## 2018-01-13 VITALS
HEIGHT: 64 IN | WEIGHT: 145.5 LBS | SYSTOLIC BLOOD PRESSURE: 98 MMHG | DIASTOLIC BLOOD PRESSURE: 58 MMHG | BODY MASS INDEX: 24.84 KG/M2

## 2018-01-13 NOTE — RESULT NOTES
Verified Results  * MRI BRAIN [facility] CONTRAST [id number] 06:48AM Michelle Pate Order Number: [id number]    - Patient Instructions: To schedule this appointment, please contact Central Scheduling at [phone]  Test Name Result Flag Reference   MRI BRAIN W WO CONTRAST (Report)     This is a summary report  The complete report is available in the patient's medical record  If you cannot access the medical record, please contact the sending organization for a detailed fax or copy  MRI BRAIN WITH AND WITHOUT CONTRAST     INDICATION: H55 00: Unspecified nystagmus   H53 9: Unspecified visual disturbance  History taken directly from the electronic ordering system  bilat occasional eye spasms and flashes of light left eye symptoms x 2 months     COMPARISON: None  TECHNIQUE:   Sagittal T1, axial T2, axial FLAIR, axial T1, axial Magdalena, axial diffusion  Sagittal, axial and coronal T1 postcontrast  Axial BRAVO post contrast       IV Contrast: 6 mL of Gadobutrol injection (SINGLE-DOSE)       IMAGE QUALITY:  Diagnostic  FINDINGS:     BRAIN PARENCHYMA: There is no discrete mass, mass effect or midline shift  No abnormal white matter signal identified  Brainstem and cerebellum demonstrate normal signal  There is no intracranial hemorrhage  There is no evidence of acute infarction and   diffusion imaging is unremarkable  Postcontrast imaging of the brain demonstrates no abnormal enhancement  Incidental note is made of a 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, a variant of normal parenchymal venous    drainage  No associated cavernous angioma detected  VENTRICLES: Normal      SELLA AND PITUITARY GLAND: Normal      ORBITS: Normal      PARANASAL SINUSES: Normal      VASCULATURE: Evaluation of the major intracranial vasculature demonstrates appropriate flow voids       CALVARIUM AND SKULL BASE: Normal      EXTRACRANIAL SOFT TISSUES: Normal        IMPRESSION:     No acute infarction, intracranial hemorrhage or mass  Workstation performed: [id number]     Signed by:    Efrain Hager MD   8/16/17   23g butterfly lt ac       Plan  Nystagmus    · 1 - Terri Noland MD, Giancarlo Heard  (Neurology) Co-Management  *  Status: Active  Requested for:  [id number]  Care Summary provided  : Yes

## 2018-01-14 VITALS — SYSTOLIC BLOOD PRESSURE: 104 MMHG | WEIGHT: 146 LBS | DIASTOLIC BLOOD PRESSURE: 68 MMHG | BODY MASS INDEX: 25.06 KG/M2

## 2018-01-14 VITALS
BODY MASS INDEX: 24.28 KG/M2 | WEIGHT: 142.25 LBS | DIASTOLIC BLOOD PRESSURE: 62 MMHG | TEMPERATURE: 96.8 F | SYSTOLIC BLOOD PRESSURE: 108 MMHG | HEIGHT: 64 IN

## 2018-01-15 NOTE — PROGRESS NOTES
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)   4  Encounter for gynecological examination without abnormal finding (V72 31) (Z01 419)   5  Fatigue (780 79) (R53 83)   6  Hypotension (458 9) (I95 9)   7  Orthostasis (458 0) (I95 1)   8  Palpitations (785 1) (R00 2)   9  Pelvic pain in female (625 9) (R10 2)   10  Polyuria (788 42) (R35 8)    Current Meds   1  MedroxyPROGESTERone Acetate 150 MG/ML Intramuscular Suspension; INJECT   EVERY 12 WEEKS AS DIRECTED; Therapy: 07WQE2187 to (Last Rx:02Feb2017)  Requested for: 60Hfj6140 Ordered    Allergies    1  No Known Drug Allergies    Vitals  Signs    Systolic: 238, RUE, Sitting  Diastolic: 64, RUE, Sitting  Height: 5 ft 4 in  Weight: 139 lb   BMI Calculated: 23 86  BSA Calculated: 1 68  LMP: [id number]    Plan  Birth control    · Depo-Provera 150 MG/ML Intramuscular Suspension  (MedroxyPROGESTERone Acetate)    Future Appointments    Date/Time Provider Specialty Site   03/16/2017 11:40 AM ANNE Yung   Endocrinology [facility] ENDOCRINOLOGY   05/08/2017 08:00 AM SLOGA INJECTION, Nurse Schedule  [facility] OB     Signatures   Electronically signed by : Judith Ruffin [facility]; Feb 14 2017 10:00AM EST                       (Author)    Electronically signed by : Sheila Swan DO; Feb 15 2017  8:25AM EST

## 2018-01-15 NOTE — RESULT NOTES
Verified Results  (1) OSMOLALITY, SERUM [id number] 01:06PM [employer]     Test Name Result Flag Reference   OSMOLALITY,SERUM 287 mOsm/kg H2O  275-295     (1) OSMOLALITY, URINE [id number] 01:06PM Eamon Weir   REPORT COMMENT:  FASTING:NO     Test Name Result Flag Reference   OSMOLALITY (U) 793 mOsm/kg H2O     The normal kidney can accommodate to dehydration by            producing urine with osmolality >850 mOsm/kg H2O      (Q) ARGININE VASOPRESSIN ( ANTIDIURETIC HORMONE) [id number] 01:06PM [employer]     Test Name Result Flag Reference   ARGININE VASOPRESSIN 2 3 pg/mL     Reference Range:                                        1 0-13 3        This test was developed and its analytical performance  characteristics have been determined by [facility]  It has not been  cleared or approved by FDA  This assay has been validated  pursuant to the CLIA regulations and is used for clinical  purposes

## 2018-01-15 NOTE — RESULT NOTES
Verified Results  HOLTER MONITOR - 24 HOUR [id number] 01:57PM Mara Herman     Test Name Result Flag Reference   HOLTER MONITOR - 24 HOUR (Report)     PT NAME: Yared Ortega   : 1985 AGE: 35 y o  GENDER: female   MRN: [id number]  PROCEDURE: Holter monitor - 24 hour       INDICATIONS: Palpitations      DESCRIPTION OF FINDINGS:   The patient was monitored for a total of 23 hours and 59 minutes  Predominant rhythm throughout the tracing noted to be normal sinus rhythm with an average heart rate of 87 beats per minute  A minimum heart rate of 46 beats per minute was noted at 3:12    AM with a maximum heart rate of 152 beats per minute noted at 6:23 PM      Rare ventricular ectopic activity consisted of 5 single PVCs  No sustained or nonsustained ventricular rhythms were noted  Rare supraventricular ectopic activity consisted of 3 single PACs  No sustained supraventricular rhythms were noted  There was no evidence of atrial fibrillation or atrial flutter  There were no significant pauses or high grade AV block  Normal diurnal heart rate variation  1  Predominant rhythm throughout the tracing noted to be normal sinus rhythm with an average heart rate of 87 beats per minute  2  Rare ventricular ectopic activity without any sustained ventricular rhythms  3  Rare supraventricular ectopic activity without any sustained supraventricular rhythms   4  No symptom-rhythm correlation as the patient did not return a diary with the monitor

## 2018-01-15 NOTE — PROGRESS NOTES
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)   3  Encounter for gynecological examination without abnormal finding (V72 31) (Z01 419)   4  Hypotension (458 9) (I95 9)   5  Orthostasis (458 0) (I95 1)   6  Palpitations (785 1) (R00 2)   7  Pelvic pain in female (625 9) (R10 2)    Current Meds   1  Desmopressin Acetate 0 1 MG Oral Tablet; TAKE 1 TABLET ONCE DAILY; Therapy: 49IJO3073 to (Leonardo Rangel)  Requested for: 58SJD1083; Last   Rx:69Ddc2567 Ordered   2  MedroxyPROGESTERone Acetate 150 MG/ML Intramuscular Suspension; INJECT   EVERY 12 WEEKS AS DIRECTED; Therapy: 14CSK1629 to (Last Rx:16Nov2016)  Requested for: 04DUO6676 Ordered    Allergies    1  No Known Drug Allergies    Vitals  Signs    Systolic: 510, LUE, Sitting  Diastolic: 70, LUE, Sitting  Height: 5 ft 4 in  Weight: 136 lb   BMI Calculated: 23 34  BSA Calculated: 1 66    Plan  Unlinked    · Depo-Provera 150 MG/ML Intramuscular Suspension  (MedroxyPROGESTERone Acetate)    Future Appointments    Date/Time Provider Specialty Site   02/13/2017 08:00 AM SLOGA INJECTION, Nurse Schedule  [facility] OB     Signatures   Electronically signed by :  Keri Valdez, ; Nov 28 2016  8:48AM EST                       (Author)    Electronically signed by : Wilfredo Martinez, [facility]; Nov 28 2016  8:59AM EST                       (Author)    Electronically signed by : ANNE Juárez ; Nov 28 2016  4:33PM EST

## 2018-01-15 NOTE — MISCELLANEOUS
Message   Recorded as Task   Date: 10/27/2017 09:03 AM, Created By: Kavya Massey   Task Name: Medical Complaint Callback   Assigned To: Duane Mathew   Regarding Patient: Adrián Monteiro, Status: In Progress   Comment:    Gris Saldaña - 27 Oct 2017 9:03 AM     TASK CREATED  Caller: Self; [phone] (Mobile Phone)  pt called in regards to pelvic pain   pt states she tested positive for clamydia and has an appt on 11/1 for f/u  Vale Cunning pt pain level is getting worse concerned about mirena please advise pt @ [phone]   NPFQW,VLOQMN - 27 Oct 2017 9:07 AM     TASK IN PROGRESS   Gia Stoner - 27 Oct 2017 9:16 AM     TASK EDITED  Spoke with patient  Patient states she has on and off pelvic pain that is sometimes a "6 or 7" out of 10  She took all her medication for the + chlamydia  She is worried because when she got the IUD she had chlamydia and didn't know it  She thinks it could possibly be PID  She went to ER for pain and they did a CT that was normal  Her ultrasound Dr Aleah Ibrahim sent her for was also normal  She has not had intercourse with her partner since her diagnosis  She is taking IBUprofen for her pain  Her discharge stopped after her she took the medicine  It is now back and has a odor and yellow discharge  She is not sure if there is anything she needs to do at this point  # [phone]  Selvin Garnett - 27 Oct 2017 9:25 AM     TASK REPLIED TO: Previously Assigned To Regla Connell should probably see her today or Monday, but please don't double book me today- I am on call tonight and have almost all new patients  Is there anyone that has any openings? ?  If not can we get her in Monday with Isiah Rangel as it is her patient? The ER did not think she had PID when she was there  Gia Stoner - 27 Oct 2017 9:26 AM     TASK IN PROGRESS   Gia Stoner - 27 Oct 2017 9:43 AM     TASK EDITED  Patient thinks she has a vaginal infection   She is having yellow discharge and fishy odor  She is coming on Wednesday for follow-up chlamydia and pelvic pain  Dr Joey Barber recommended a OVL  She lives a hour away and has work today so she can't come in  [facility] [place] is further for her so she doesn't want to come there either  She will go to ER if she has severe pain  The reports from the CT scan and U/S show no evidence of PID per Dr Joey Barber  I reassured patient  Do you want to treat her prior to appointment or just evaluate her on Wednesday? She is aware we would let her know what to do Monday  Mckeon Michael - 10 Oct 2017 8:21 AM     TASK REPLIED TO: Previously Assigned To Emily\[facility] [place] [facility]\""Rosa  can send in flagyl for BV, metrogel would mess up culture wed  Maryshiraze Noon - 30 Oct 2017 9:08 AM     TASK IN PROGRESS   Pamela Ruiz - 30 Oct 2017 9:11 AM     TASK EDITED  [facility] for pt to cb  Rx sent to pharmacy  ext: [id number]   Pamela Ruiz - 30 Oct 2017 9:16 AM     TASK EDITED  Patient returned call - she is aware that RX was sent to pharmacy - gave direction of use and told her not to drink any alcohol and practice pelvic rest  Will see her wed  Active Problems    1  Chlamydial infection of lower genitourinary tract (099 53) (A56 00)   2  Encounter for general counseling and advice on contraceptive management (V25 09)   (Z30 09)   3  Encounter for insertion of mirena IUD (V25 11) (Z30 430)   4  Nystagmus (379 50) (H55 00)   5  Pelvic pain in female (625 9) (R10 2)   6  Tobacco use (305 1) (Z72 0)   7  Vaginal discharge (623 5) (N89 8)   8  Vision changes (368 9) (H53 9)    Current Meds   1  Azithromycin 250 MG Oral Tablet; TAKE 4  TABLET Oral;   Therapy: 13EFJ8591 to (Last Rx:13Oct2017)  Requested for: 41DXL5907 Ordered   2  Chantix Continuing Month Marlon 1 MG Oral Tablet; TAKE 1 TABLET TWICE DAILY; Therapy: 11Aug2017 to (Evaluate:26Jan2018)  Requested for: 11Aug2017; Last   Rx:11Aug2017 Ordered   3   Chantix Starting Month Marlon 0 5 MG X 11 & 1 MG X 42 Oral Tablet; TAKE AS DIRECTED   PER PACKAGE INSTRUCTIONS; Therapy: 11Aug2017 to (Last Rx:11Aug2017)  Requested for: 11Aug2017 Ordered   4  MetroNIDAZOLE 500 MG Oral Tablet (Flagyl); Take 1 capsule twice daily; Therapy: 36MXS6884 to (Cassandra Montalvo)  Requested for: 49QAX8108; Last   Rx:30Oct2017; Status: ACTIVE - Retrospective By Protocol Authorization Ordered    Allergies    1   No Known Drug Allergies    Signatures   Electronically signed by : Essie Scott, ; Oct 30 2017  9:17AM EST                       (Author)

## 2018-01-16 NOTE — RESULT NOTES
Verified Results  (1) OSMOLALITY, SERUM [id number] 03:14PM Levora Kotyk     Test Name Result Flag Reference   OSMOLALITY,SERUM 280 mOsm/kg H2O  275-295     (1) OSMOLALITY, URINE [id number] 03:14PM Eamon Weir   REPORT COMMENT:  FASTING:NO     Test Name Result Flag Reference   OSMOLALITY (U) 573 mOsm/kg H2O     The normal kidney can accommodate to dehydration by            producing urine with osmolality >850 mOsm/kg H2O      (Q) ARGININE VASOPRESSIN ( ANTIDIURETIC HORMONE) [id number] 03:14PM Levora Kotyk     Test Name Result Flag Reference   ARGININE VASOPRESSIN <1 0 pg/mL L    Reference Range:                                        1 0-13 3        This test was developed and its analytical performance  characteristics have been determined by [facility]  It has not been  cleared or approved by FDA  This assay has been validated  pursuant to the CLIA regulations and is used for clinical  purposes

## 2018-01-16 NOTE — MISCELLANEOUS
Message   Recorded as Task   Date: 11/07/2017 05:09 PM, Created By: Tito Alexandra   Task Name: Call Back   Assigned To: Jacoby Morton   Regarding Patient: Luana Goldberg, Status: In Progress   Michael Chan - 07 Nov 2017 5:09 PM     TASK CREATED  Caller: Self; Results Inquiry; [phone] (Home)  pt calling for STD test results done 11/01   Papito Larisa - 07 Nov 2017 5:12 PM     TASK IN PROGRESS   Essie Saxena - 07 Nov 2017 5:16 PM     TASK EDITED  pt informed chl cult and vaginitis culture wnl        Active Problems    1  History of Chlamydial infection of lower genitourinary tract (099 53) (A56 00)   2  Encounter for general counseling and advice on contraceptive management (V25 09)   (Z30 09)   3  Encounter for insertion of mirena IUD (V25 11) (Z30 430)   4  IUD (intrauterine device) in place (V45 51) (Z97 5)   5  Nystagmus (379 50) (H55 00)   6  Pelvic pain in female (625 9) (R10 2)   7  Tobacco use (305 1) (Z72 0)   8  Vaginal discharge (623 5) (N89 8)   9  Vision changes (368 9) (H53 9)    Current Meds   1  Chantix Continuing Month Marlon 1 MG Oral Tablet; TAKE 1 TABLET TWICE DAILY; Therapy: 11Aug2017 to (Evaluate:26Jan2018)  Requested for: 11Aug2017; Last   Rx:11Aug2017 Ordered   2  Chantix Starting Month Marlon 0 5 MG X 11 & 1 MG X 42 Oral Tablet; TAKE AS DIRECTED   PER PACKAGE INSTRUCTIONS; Therapy: 11Aug2017 to (Last Rx:11Aug2017)  Requested for: 11Aug2017 Ordered   3  MetroNIDAZOLE 500 MG Oral Tablet (Flagyl); Take 1 capsule twice daily; Therapy: 01LCT3894 to ([facility] )  Requested for: 56EMY5408; Last   Rx:30Oct2017 Ordered    Allergies    1   No Known Drug Allergies    Signatures   Electronically signed by : Thaddeus Cramer, ; Nov 7 2017  5:16PM EST                       (Author)

## 2018-01-22 VITALS — DIASTOLIC BLOOD PRESSURE: 68 MMHG | SYSTOLIC BLOOD PRESSURE: 102 MMHG | WEIGHT: 144 LBS | BODY MASS INDEX: 24.72 KG/M2

## 2018-01-22 VITALS
WEIGHT: 148 LBS | SYSTOLIC BLOOD PRESSURE: 110 MMHG | HEIGHT: 64 IN | DIASTOLIC BLOOD PRESSURE: 80 MMHG | BODY MASS INDEX: 25.27 KG/M2

## 2018-01-22 VITALS — DIASTOLIC BLOOD PRESSURE: 58 MMHG | BODY MASS INDEX: 25.63 KG/M2 | SYSTOLIC BLOOD PRESSURE: 118 MMHG | WEIGHT: 154 LBS

## 2018-01-22 VITALS
SYSTOLIC BLOOD PRESSURE: 108 MMHG | WEIGHT: 139 LBS | BODY MASS INDEX: 23.73 KG/M2 | DIASTOLIC BLOOD PRESSURE: 64 MMHG | HEIGHT: 64 IN

## 2018-01-22 VITALS — DIASTOLIC BLOOD PRESSURE: 58 MMHG | WEIGHT: 148 LBS | SYSTOLIC BLOOD PRESSURE: 102 MMHG | BODY MASS INDEX: 24.63 KG/M2

## 2018-04-15 ENCOUNTER — HOSPITAL ENCOUNTER (EMERGENCY)
Facility: HOSPITAL | Age: 33
Discharge: HOME | End: 2018-04-15
Attending: EMERGENCY MEDICINE | Admitting: EMERGENCY MEDICINE
Payer: COMMERCIAL

## 2018-04-15 VITALS
OXYGEN SATURATION: 97 % | HEART RATE: 68 BPM | RESPIRATION RATE: 22 BRPM | WEIGHT: 156.97 LBS | BODY MASS INDEX: 26.12 KG/M2 | SYSTOLIC BLOOD PRESSURE: 112 MMHG | TEMPERATURE: 97.9 F | DIASTOLIC BLOOD PRESSURE: 62 MMHG

## 2018-04-15 DIAGNOSIS — R00.2 PALPITATIONS: Primary | ICD-10-CM

## 2018-04-15 LAB
ALBUMIN SERPL BCP-MCNC: 3.5 G/DL (ref 3.5–5)
ALP SERPL-CCNC: 65 U/L (ref 46–116)
ALT SERPL W P-5'-P-CCNC: 37 U/L (ref 12–78)
ANION GAP SERPL CALCULATED.3IONS-SCNC: 12 MMOL/L (ref 4–13)
AST SERPL W P-5'-P-CCNC: 23 U/L (ref 5–45)
BASOPHILS # BLD AUTO: 0.04 THOUSANDS/ΜL (ref 0–0.1)
BASOPHILS NFR BLD AUTO: 0 % (ref 0–1)
BILIRUB SERPL-MCNC: 0.5 MG/DL (ref 0.2–1)
BUN SERPL-MCNC: 17 MG/DL (ref 5–25)
CALCIUM SERPL-MCNC: 9.4 MG/DL
CHLORIDE SERPL-SCNC: 105 MMOL/L (ref 100–108)
CO2 SERPL-SCNC: 24 MMOL/L (ref 21–32)
CREAT SERPL-MCNC: 0.82 MG/DL (ref 0.6–1.3)
EOSINOPHIL # BLD AUTO: 0.17 THOUSAND/ΜL (ref 0–0.61)
EOSINOPHIL NFR BLD AUTO: 2 % (ref 0–6)
ERYTHROCYTE [DISTWIDTH] IN BLOOD BY AUTOMATED COUNT: 12.8 % (ref 11.6–15.1)
GFR SERPL CREATININE-BSD FRML MDRD: 96 ML/MIN/1.73SQ M
GLUCOSE SERPL-MCNC: 82 MG/DL (ref 65–140)
HCT VFR BLD AUTO: 40.6 % (ref 34.8–46.1)
HGB BLD-MCNC: 14.5 G/DL (ref 11.5–15.4)
LYMPHOCYTES # BLD AUTO: 4.01 THOUSANDS/ΜL (ref 0.6–4.47)
LYMPHOCYTES NFR BLD AUTO: 42 % (ref 14–44)
MAGNESIUM SERPL-MCNC: 2.2 MG/DL (ref 1.6–2.6)
MCH RBC QN AUTO: 32.6 PG (ref 26.8–34.3)
MCHC RBC AUTO-ENTMCNC: 35.7 G/DL (ref 31.4–37.4)
MCV RBC AUTO: 91 FL (ref 82–98)
MONOCYTES # BLD AUTO: 0.7 THOUSAND/ΜL (ref 0.17–1.22)
MONOCYTES NFR BLD AUTO: 7 % (ref 4–12)
NEUTROPHILS # BLD AUTO: 4.7 THOUSANDS/ΜL (ref 1.85–7.62)
NEUTS SEG NFR BLD AUTO: 49 % (ref 43–75)
PLATELET # BLD AUTO: 276 THOUSANDS/UL (ref 149–390)
PMV BLD AUTO: 10.1 FL (ref 8.9–12.7)
POTASSIUM SERPL-SCNC: 3.3 MMOL/L (ref 3.5–5.3)
PROT SERPL-MCNC: 6.9 G/DL (ref 6.4–8.2)
RBC # BLD AUTO: 4.45 MILLION/UL (ref 3.81–5.12)
SODIUM SERPL-SCNC: 141 MMOL/L (ref 136–145)
TROPONIN I SERPL-MCNC: <0.02 NG/ML
TSH SERPL DL<=0.05 MIU/L-ACNC: 3.21 UIU/ML (ref 0.36–3.74)
WBC # BLD AUTO: 9.62 THOUSAND/UL (ref 4.31–10.16)

## 2018-04-15 PROCEDURE — 84484 ASSAY OF TROPONIN QUANT: CPT | Performed by: EMERGENCY MEDICINE

## 2018-04-15 PROCEDURE — 93005 ELECTROCARDIOGRAM TRACING: CPT | Performed by: EMERGENCY MEDICINE

## 2018-04-15 PROCEDURE — 83735 ASSAY OF MAGNESIUM: CPT | Performed by: EMERGENCY MEDICINE

## 2018-04-15 PROCEDURE — 36415 COLL VENOUS BLD VENIPUNCTURE: CPT | Performed by: EMERGENCY MEDICINE

## 2018-04-15 PROCEDURE — 80053 COMPREHEN METABOLIC PANEL: CPT | Performed by: EMERGENCY MEDICINE

## 2018-04-15 PROCEDURE — 84443 ASSAY THYROID STIM HORMONE: CPT | Performed by: EMERGENCY MEDICINE

## 2018-04-15 PROCEDURE — 99285 EMERGENCY DEPT VISIT HI MDM: CPT

## 2018-04-15 PROCEDURE — 85025 COMPLETE CBC W/AUTO DIFF WBC: CPT | Performed by: EMERGENCY MEDICINE

## 2018-04-15 NOTE — ED PROVIDER NOTES
History  Chief Complaint   Patient presents with    Palpitations     Pt c/o intermittent episodes of fluttering in chest that "felt like a butterfly flapping its wings" - also had 1 episode of chest pressure that resolved in a short period of time - had episode of same last year and wore holter monitor with results being normal     36-year-old female with complaints of palpitations throughout most of the day  She feels as if there is a fluttering or butterfly in her chest   Peer she has had this evaluated previously she underwent Holter monitoring in 2016 but she felt normal during the test time  She has had it intermittently throughout the day  She is denying any lightheadedness diaphoresis increasing dyspnea on exertion shortness of breath pleuritic pain she actually had less caffeine than usual   She does not use decongestants  She has not started or stopped any medications  She has had no chest pain no lower extremity edema no fever no cough or upper respiratory complaints  Prior to Admission Medications   Prescriptions Last Dose Informant Patient Reported?  Taking?   levonorgestrel (MIRENA) 20 MCG/24HR IUD   Yes No   Si each by Intrauterine route once      Facility-Administered Medications: None       Past Medical History:   Diagnosis Date    Anxiety and depression     08 AUG 2017  RESOLVED    Diabetes insipidus ([facility] [address] )     RESOLVED 11 MAY 2017    Hypotension     16 MAR 2017  RESOLVED    Irritable bowel     Orthostasis     RESOLVED 16 MAR 2017    Palpitations     16 MAR 2017  RESOLVED       Past Surgical History:   Procedure Laterality Date    BREAST SURGERY      ENLARGEMENT    CERVICAL BIOPSY  W/ LOOP ELECTRODE EXCISION      LASIK      UTERINE FIBROID SURGERY         Family History   Problem Relation Age of Onset    Diabetes Mother     Hypertension Mother     Heart disease Maternal Grandmother      CARDIAC DISORDER    Hypertension Maternal Grandmother     Hypertension Maternal Grandfather     Other Family      SKIN CONDITIONS PATERNAL GREAT GRANDFATHER     I have reviewed and agree with the history as documented  Social History   Substance Use Topics    Smoking status: Current Every Day Smoker    Smokeless tobacco: Never Used    Alcohol use Yes      Comment: ocassional        Review of Systems   Constitutional: Negative for activity change, appetite change, chills, diaphoresis, fatigue and fever  HENT: Negative for congestion, ear pain, rhinorrhea, sneezing and sore throat  Eyes: Negative for discharge  Respiratory: Negative for cough and shortness of breath  Cardiovascular: Positive for palpitations  Negative for chest pain and leg swelling  Gastrointestinal: Negative for abdominal pain, blood in stool, diarrhea, nausea and vomiting  Endocrine: Negative for polyuria  Genitourinary: Negative for dysuria, frequency and urgency  Musculoskeletal: Negative for back pain and myalgias  Skin: Negative for rash  Neurological: Negative for dizziness and numbness  Hematological: Negative for adenopathy  Psychiatric/Behavioral: Negative for confusion  All other systems reviewed and are negative  Physical Exam  ED Triage Vitals [04/15/18 0052]   Temperature Pulse Respirations Blood Pressure SpO2   97 9 °F (36 6 °C) 76 16 128/75 98 %      Temp Source Heart Rate Source Patient Position - Orthostatic VS BP Location FiO2 (%)   Temporal Monitor Lying Left arm --      Pain Score       --           Orthostatic Vital Signs  Vitals:    04/15/18 0200 04/15/18 0230 04/15/18 0300 04/15/18 0330   BP:    112/62   Pulse:    68   Patient Position - Orthostatic VS: Lying Lying Sitting Lying       Physical Exam   Constitutional: She is oriented to person, place, and time  She appears well-developed  No distress  HENT:   Head: Normocephalic and atraumatic     Right Ear: External ear normal    Left Ear: External ear normal    Nose: Nose normal    Mouth/Throat: Oropharynx is clear and moist  No oropharyngeal exudate  Eyes: Conjunctivae and EOM are normal  Pupils are equal, round, and reactive to light  Right eye exhibits no discharge  Left eye exhibits no discharge  Neck: Normal range of motion  Neck supple  No midline or paraspinous tenderness   Cardiovascular: Normal rate, regular rhythm, normal heart sounds and intact distal pulses  Pulmonary/Chest: Effort normal and breath sounds normal  No respiratory distress  Abdominal: Soft  Bowel sounds are normal  She exhibits no distension  There is no tenderness  There is no rebound and no guarding  Back no midline or CVA tenderness   Musculoskeletal: Normal range of motion  She exhibits no edema, tenderness or deformity  Neurological: She is alert and oriented to person, place, and time  No cranial nerve deficit or sensory deficit  She exhibits normal muscle tone  Coordination normal    Gait steady   Skin: Skin is warm and dry  She is not diaphoretic  Psychiatric: She has a normal mood and affect  Vitals reviewed  ED Medications  Medications - No data to display    Diagnostic Studies  Results Reviewed     Procedure Component Value Units Date/Time    TSH [[id number]]  (Normal) Collected:  04/15/18 0104    Lab Status:  Final result Specimen:  Blood from Arm, Right Updated:  04/15/18 0133     TSH 3RD GENERATON 3 214 uIU/mL     Narrative:         Patients undergoing fluorescein dye angiography may retain small amounts of fluorescein in the body for 48-72 hours post procedure  Samples containing fluorescein can produce falsely depressed TSH values  If the patient had this procedure,a specimen should be resubmitted post fluorescein clearance            The recommended reference ranges for TSH during pregnancy are as follows:  First trimester 0 1 to 2 5 uIU/mL  Second trimester  0 2 to 3 0 uIU/mL  Third trimester 0 3 to 3 0 uIU/m      Magnesium [[id number]]  (Normal) Collected:  04/15/18 0104    Lab Status:  Final result Specimen:  Blood from Arm, Right Updated:  04/15/18 0133     Magnesium 2 2 mg/dL     Comprehensive metabolic panel [[id number]]  (Abnormal) Collected:  04/15/18 0104    Lab Status:  Final result Specimen:  Blood from Arm, Right Updated:  04/15/18 0133     Sodium 141 mmol/L      Potassium 3 3 (L) mmol/L      Chloride 105 mmol/L      CO2 24 mmol/L      Anion Gap 12 mmol/L      BUN 17 mg/dL      Creatinine 0 82 mg/dL      Glucose 82 mg/dL      Calcium 9 4 mg/dL      AST 23 U/L      ALT 37 U/L      Alkaline Phosphatase 65 U/L      Total Protein 6 9 g/dL      Albumin 3 5 g/dL      Total Bilirubin 0 50 mg/dL      eGFR 96 ml/min/1 73sq m     Narrative:         National Kidney Disease Education Program recommendations are as follows:  GFR calculation is accurate only with a steady state creatinine  Chronic Kidney disease less than 60 ml/min/1 73 sq  meters  Kidney failure less than 15 ml/min/1 73 sq  meters  Troponin I [[id number]]  (Normal) Collected:  04/15/18 0104    Lab Status:  Final result Specimen:  Blood from Arm, Right Updated:  04/15/18 0127     Troponin I <0 02 ng/mL     Narrative:         [employer] Chemistry analyzer 99% cutoff is > 0 04 ng/mL in network labs    o cTnI 99% cutoff is useful only when applied to patients in the clinical setting of myocardial ischemia  o cTnI 99% cutoff should be interpreted in the context of clinical history, ECG findings and possibly cardiac imaging to establish correct diagnosis  o cTnI 99% cutoff may be suggestive but clearly not indicative of a coronary event without the clinical setting of myocardial ischemia      CBC and differential [[id number]]  (Normal) Collected:  04/15/18 0104    Lab Status:  Final result Specimen:  Blood from Arm, Right Updated:  04/15/18 0111     WBC 9 62 Thousand/uL      RBC 4 45 Million/uL      Hemoglobin 14 5 g/dL      Hematocrit 40 6 %      MCV 91 fL      MCH 32 6 pg      MCHC 35 7 g/dL      RDW 12 8 %      MPV 10 1 fL      Platelets 144 Thousands/uL Neutrophils Relative 49 %      Lymphocytes Relative 42 %      Monocytes Relative 7 %      Eosinophils Relative 2 %      Basophils Relative 0 %      Neutrophils Absolute 4 70 Thousands/µL      Lymphocytes Absolute 4 01 Thousands/µL      Monocytes Absolute 0 70 Thousand/µL      Eosinophils Absolute 0 17 Thousand/µL      Basophils Absolute 0 04 Thousands/µL                  No orders to display              Procedures  Procedures       Phone Contacts  ED Phone Contact    ED Course  ED Course as of Apr 15 1259   Sun Apr 15, 2018   0327 Reviewed labs ekg will proceed with repeat holter  And recommend cardiology f/u declines rx for postassium prefers dietary  MDM  Number of Diagnoses or Management Options  Palpitations:   Diagnosis management comments: Mdm: reviewed labs TSH, electrolytes normal, no anemia, reiviewed EKG findings, patients resting HR currenly in 60 would not recommend b-blocker  Recommend 48hr holter and follow up with cardiology  CritCare Time    Disposition  Final diagnoses:   Palpitations     Time reflects when diagnosis was documented in both MDM as applicable and the Disposition within this note     Time User Action Codes Description Comment    4/15/2018  3:30 AM Bridgett Ha Add [R00 2] Palpitations       ED Disposition     ED Disposition Condition Comment    Discharge  Sammie Albert discharge to home/self care      Condition at discharge: Good        Follow-up Information     Follow up With Specialties Details Why [facility],  Family Medicine   [address] [place] [address]  [address]  [place] [address] [facility] Call in 1 day  [facility] [id number] [phone]        Discharge Medication List as of 4/15/2018  3:33 AM      CONTINUE these medications which have NOT CHANGED    Details   levonorgestrel (MIRENA) 20 MCG/24HR IUD 1 each by Intrauterine route once, Historical Med             Outpatient Discharge Orders  Holter monitor - 48 hour   Standing Status: Future  Standing Exp   Date: 04/15/22         ED Provider  Electronically Signed by           Josselyn Mckinnon MD  04/15/18 0112

## 2018-04-15 NOTE — DISCHARGE INSTRUCTIONS
Heart Palpitations   WHAT YOU NEED TO KNOW:   Heart palpitations are feelings that your heart races, jumps, throbs, or flutters  You may feel extra beats, no beats for a short time, or skipped beats  You may have these feelings in your chest, throat, or neck  They may happen when you are sitting, standing, or lying  Heart palpitations may be frightening, but are usually not caused by a serious problem  DISCHARGE INSTRUCTIONS:   Call 911 or have someone else call for any of the following:   · You have any of the following signs of a heart attack:      ¨ Squeezing, pressure, or pain in your chest that lasts longer than 5 minutes or returns    ¨ Discomfort or pain in your back, neck, jaw, stomach, or arm     ¨ Trouble breathing    ¨ Nausea or vomiting    ¨ Lightheadedness or a sudden cold sweat, especially with chest pain or trouble breathing    · You have any of the following signs of a stroke:      ¨ Numbness or drooping on one side of your face     ¨ Weakness in an arm or leg    ¨ Confusion or difficulty speaking    ¨ Dizziness, a severe headache, or vision loss    · You faint or lose consciousness  Return to the emergency department if:   · Your palpitations happen more often or get more intense  Contact your healthcare provider if:   · You have new or worsening swelling in your feet or ankles  · You have questions or concerns about your condition or care  Follow up with your healthcare provider as directed: You may need to follow up with a cardiologist  Joana Marroquin may need tests to check for heart problems that cause palpitations  Write down your questions so you remember to ask them during your visits  Keep a record:  Write down when your palpitations start and stop, what you were doing when they started, and your symptoms  Keep track of what you ate or drank within a few hours of your palpitations  Include anything that seemed to help your symptoms, such as lying down or holding your breath   This record will help you and your healthcare provider learn what triggers your palpitations  Bring this record with you to your follow up visits  Help prevent heart palpitations:   · Manage stress and anxiety  Find ways to relax such as listening to music, meditating, or doing yoga  Exercise can also help decrease stress and anxiety  Talk to someone you trust about your stress or anxiety  You can also talk to a therapist      · Get plenty of sleep every night  Ask your healthcare provider how much sleep you need each night  · Do not drink caffeine or alcohol  Caffeine and alcohol can make your palpitations worse  Caffeine is found in soda, coffee, tea, chocolate, and drinks that increase your energy  · Do not smoke  Nicotine and other chemicals in cigarettes and cigars may damage your heart and blood vessels  Ask your healthcare provider for information if you currently smoke and need help to quit  E-cigarettes or smokeless tobacco still contain nicotine  Talk to your healthcare provider before you use these products  · Do not use illegal drugs  Talk to your healthcare provider if you use illegal drugs and want help to quit  © 2017 [facility] Information is for End User's use only and may not be sold, redistributed or otherwise used for commercial purposes  All illustrations and images included in CareNotes® are the copyrighted property of A D A M , Inc  or [employer]  The above information is an  only  It is not intended as medical advice for individual conditions or treatments  Talk to your doctor, nurse or pharmacist before following any medical regimen to see if it is safe and effective for you  Heart Palpitations   WHAT YOU NEED TO KNOW:   Heart palpitations are feelings that your heart races, jumps, throbs, or flutters  You may feel extra beats, no beats for a short time, or skipped beats  You may have these feelings in your chest, throat, or neck  They may happen when you are sitting, standing, or lying  Heart palpitations may be frightening, but are usually not caused by a serious problem  DISCHARGE INSTRUCTIONS:   Call 911 or have someone else call for any of the following:   · You have any of the following signs of a heart attack:      ¨ Squeezing, pressure, or pain in your chest that lasts longer than 5 minutes or returns    ¨ Discomfort or pain in your back, neck, jaw, stomach, or arm     ¨ Trouble breathing    ¨ Nausea or vomiting    ¨ Lightheadedness or a sudden cold sweat, especially with chest pain or trouble breathing    · You have any of the following signs of a stroke:      ¨ Numbness or drooping on one side of your face     ¨ Weakness in an arm or leg    ¨ Confusion or difficulty speaking    ¨ Dizziness, a severe headache, or vision loss    · You faint or lose consciousness  Return to the emergency department if:   · Your palpitations happen more often or get more intense  Contact your healthcare provider if:   · You have new or worsening swelling in your feet or ankles  · You have questions or concerns about your condition or care  Follow up with your healthcare provider as directed: You may need to follow up with a cardiologist  Adrian Morrissey may need tests to check for heart problems that cause palpitations  Write down your questions so you remember to ask them during your visits  Keep a record:  Write down when your palpitations start and stop, what you were doing when they started, and your symptoms  Keep track of what you ate or drank within a few hours of your palpitations  Include anything that seemed to help your symptoms, such as lying down or holding your breath  This record will help you and your healthcare provider learn what triggers your palpitations  Bring this record with you to your follow up visits  Help prevent heart palpitations:   · Manage stress and anxiety    Find ways to relax such as listening to music, meditating, or doing yoga  Exercise can also help decrease stress and anxiety  Talk to someone you trust about your stress or anxiety  You can also talk to a therapist      · Get plenty of sleep every night  Ask your healthcare provider how much sleep you need each night  · Do not drink caffeine or alcohol  Caffeine and alcohol can make your palpitations worse  Caffeine is found in soda, coffee, tea, chocolate, and drinks that increase your energy  · Do not smoke  Nicotine and other chemicals in cigarettes and cigars may damage your heart and blood vessels  Ask your healthcare provider for information if you currently smoke and need help to quit  E-cigarettes or smokeless tobacco still contain nicotine  Talk to your healthcare provider before you use these products  · Do not use illegal drugs  Talk to your healthcare provider if you use illegal drugs and want help to quit  © 2017 [address] [employer] Information is for End User's use only and may not be sold, redistributed or otherwise used for commercial purposes  All illustrations and images included in CareNotes® are the copyrighted property of A D A M , Inc  or Nhan Carver  The above information is an  only  It is not intended as medical advice for individual conditions or treatments  Talk to your doctor, nurse or pharmacist before following any medical regimen to see if it is safe and effective for you  Potassium Content of Foods List   WHAT YOU NEED TO KNOW:   Potassium is a mineral that is found in most foods  Potassium helps to balance fluids and minerals in your body  It also helps your body maintain a normal blood pressure  Potassium helps your muscles contract and your nerves function normally  You may need to increase or decrease potassium if you have certain health conditions     DISCHARGE INSTRUCTIONS:   Why you may need to change the amount of potassium you eat:   · You may need more potassium  if you have hypokalemia (low potassium levels) or high blood pressure  You may also need more potassium if you are taking diuretics  Diuretics and certain medicines cause your body to lose potassium  · You may need less potassium  in your diet if you have hyperkalemia (high potassium levels) or kidney disease  Potassium content of fruit:  The amount of potassium in milligrams (mg) contained in each fruit or serving of fruit is listed beside the item    · High-potassium foods (more than 200 mg per serving):      ¨ 1 medium banana (425)    ¨ ½ of a papaya (390)    ¨ ½ cup of prune juice (370)    ¨ ¼ cup of raisins (270)    ¨ 1 medium butch (325) or kiwi (240)    ¨ 1 small orange (240) or ½ cup of orange juice (235)    ¨ ½ cup of cubed cantaloupe (215) or diced honeydew melon (200)    ¨ 1 medium pear (200)    · Medium-potassium foods (50 to 200 mg per serving):      ¨ 1 medium peach (185)    ¨ 1 small apple or ½ cup of apple juice (150)    ¨ ½ cup of peaches canned in juice (120)    ¨ ½ cup of canned pineapple (100)    ¨ ½ cup of fresh, sliced strawberries (046)    ¨ ½ cup of watermelon (85)    · Low-potassium foods (less than 50 mg per serving):      ¨ ½ cup of cranberries (45) or cranberry juice cocktail (20)    ¨ ½ cup of nectar of papaya, butch, or pear (35)  Potassium content of vegetables:   · High-potassium foods (more than 200 mg per serving):      ¨ 1 medium baked potato, with skin (925)    ¨ 1 baked medium sweet potato, with skin (450)    ¨ ½ cup of tomato or vegetable juice (275), or 1 medium raw tomato (290)    ¨ ½ cup of mushrooms (280)    ¨ ½ cup of fresh brussels sprouts (250)    ¨ ½ cup of cooked zucchini (220) or winter squash (250)    ¨ ¼ of a medium avocado (245)    ¨ ½ cup of broccoli (230)    · Medium-potassium foods (50 to 200 mg per serving):      ¨ ½ cup of corn (195)    ¨ ½ cup of fresh or cooked carrots (180)    ¨ ½ cup of fresh cauliflower (150)    ¨ ½ cup of asparagus (155)    ¨ ½ cup of canned peas (90)     ¨ 1 cup of lettuce, all types (100)    ¨ ½ cup of fresh green beans (90)    ¨ ½ cup of frozen green beans (85)    ¨ ½ cup of cucumber (80)  Potassium content of protein foods:   · High-potassium foods (more than 200 mg per serving):      ¨ ½ cup of cooked branch beans (400) or lentils (365)    ¨ 1 cup of soy milk (300)    ¨ 3 ounces of baked or broiled salmon (319)    ¨ 3 ounces of roasted turkey, dark meat (250)    ¨ ¼ cup of sunflower seeds (241)    ¨ 3 ounces of cooked lean beef (224)    ¨ 2 tablespoons of smooth peanut butter (210)    · Medium-potassium foods (50 to 200 mg per serving):      ¨ 1 ounce of salted peanuts, almonds, or cashews (200)    ¨ 1 large egg (60 mg)  Potassium content of dairy foods:   · High-potassium foods (more than 200 mg per serving):      ¨ 6 ounces of yogurt (260 to 435)    ¨ 1 cup of nonfat, low-fat, or whole milk (350 to 380)    · Medium-potassium foods (50 to 200 mg per serving):      ¨ ½ cup of ricotta cheese (154)    ¨ ½ cup of vanilla ice cream (131)    ¨ ½ cup of low-fat (2%) cottage cheese (110)    · Low-potassium foods (less than 50 mg per serving):      ¨ 1 ounce of cheese (20 to 30)    Potassium content of grains:   · 1 slice of white bread (30)    · ½ cup of white or brown rice (50)    · ½ cup of spaghetti or macaroni (30)    · 1 flour or corn tortilla (50)    · 1 four-inch waffle (50)  Potassium content of other foods:   · 1 tablespoon of molasses (295)    · 1½ ounces of chocolate (165)    · Some salt substitutes may contain a high amount of potassium  Check the food label to find the amount of potassium it contains  © 2017 [address] [employer] Information is for End User's use only and may not be sold, redistributed or otherwise used for commercial purposes  All illustrations and images included in CareNotes® are the copyrighted property of A D A M , Inc  or Nhan Carver  The above information is an  only   It is not intended as medical advice for individual conditions or treatments  Talk to your doctor, nurse or pharmacist before following any medical regimen to see if it is safe and effective for you

## 2018-04-16 LAB
ATRIAL RATE: 70 BPM
P AXIS: 25 DEGREES
PR INTERVAL: 126 MS
QRS AXIS: 90 DEGREES
QRSD INTERVAL: 80 MS
QT INTERVAL: 396 MS
QTC INTERVAL: 427 MS
T WAVE AXIS: 70 DEGREES
VENTRICULAR RATE: 70 BPM

## 2018-04-16 PROCEDURE — 93010 ELECTROCARDIOGRAM REPORT: CPT | Performed by: INTERNAL MEDICINE

## 2018-06-21 ENCOUNTER — OFFICE VISIT (OUTPATIENT)
Dept: URGENT CARE | Facility: CLINIC | Age: 33
End: 2018-06-21
Payer: COMMERCIAL

## 2018-06-21 VITALS
RESPIRATION RATE: 18 BRPM | BODY MASS INDEX: 25.83 KG/M2 | WEIGHT: 155.2 LBS | SYSTOLIC BLOOD PRESSURE: 105 MMHG | TEMPERATURE: 99.3 F | OXYGEN SATURATION: 98 % | DIASTOLIC BLOOD PRESSURE: 63 MMHG | HEART RATE: 90 BPM

## 2018-06-21 DIAGNOSIS — J20.9 ACUTE BRONCHITIS, UNSPECIFIED ORGANISM: Primary | ICD-10-CM

## 2018-06-21 PROCEDURE — G0382 LEV 3 HOSP TYPE B ED VISIT: HCPCS | Performed by: PHYSICIAN ASSISTANT

## 2018-06-21 RX ORDER — BENZONATATE 100 MG/1
100 CAPSULE ORAL 3 TIMES DAILY PRN
Qty: 20 CAPSULE | Refills: 0 | Status: SHIPPED | OUTPATIENT
Start: 2018-06-21 | End: 2018-11-13 | Stop reason: ALTCHOICE

## 2018-06-21 RX ORDER — AZITHROMYCIN 250 MG/1
TABLET, FILM COATED ORAL
Qty: 6 TABLET | Refills: 0 | Status: SHIPPED | COMMUNITY
Start: 2018-06-21 | End: 2018-06-25

## 2018-06-21 NOTE — PROGRESS NOTES
[address]  (office) [phone]  (fax) [phone]        NAME: Ashley Campbell is a 33 y o  female  : 1985    MRN: [id number]  DATE: 2018  TIME: 12:43 PM    Assessment and Plan   Acute bronchitis, unspecified organism [J20 9]  1  Acute bronchitis, unspecified organism  azithromycin (ZITHROMAX) 250 mg tablet    benzonatate (TESSALON PERLES) 100 mg capsule       Patient Instructions   I have prescribed an antibiotic for the infection  Please take the antibiotic as prescribed and finish the entire prescription  I recommend that the patient takes an over the counter probiotic or eats yogurt with live cultures in it [place]) to keep good bacteria in the gut and help prevent diarrhea  Wash hands frequently to prevent the spread of infection  Can use over the counter cough and cold medications to help with symptoms  Ibuprofen and/or tylenol as needed for pain or fever  If not improving over the next 3-5 days, follow up with PCP  To present to the ER if symptoms worsen  Chief Complaint     Chief Complaint   Patient presents with    Cough     p/s she was sick with fever 2 weeks ago and developed a cough that hasn't gone away         History of Present Illness   Ashley Campbell presents to the clinic c/o    Cough   This is a new problem  The current episode started 1 to 4 weeks ago  The problem has been gradually worsening  The problem occurs constantly  The cough is productive of sputum  Associated symptoms include nasal congestion and postnasal drip  Pertinent negatives include no chest pain, chills, ear congestion, ear pain, eye redness, fever, headaches, myalgias, rash, rhinorrhea, sore throat, shortness of breath or wheezing  Nothing aggravates the symptoms  She has tried OTC cough suppressant for the symptoms  The treatment provided no relief  Her past medical history is significant for bronchitis         Review of Systems Review of Systems   Constitutional: Negative for activity change, appetite change, chills, diaphoresis, fatigue and fever  HENT: Positive for postnasal drip  Negative for congestion, ear discharge, ear pain, facial swelling, rhinorrhea, sinus pain, sinus pressure, sneezing and sore throat  Eyes: Negative for photophobia, pain, discharge, redness, itching and visual disturbance  Respiratory: Positive for cough  Negative for apnea, chest tightness, shortness of breath and wheezing  Cardiovascular: Negative for chest pain  Gastrointestinal: Negative for abdominal distention, abdominal pain, anal bleeding, blood in stool, constipation, diarrhea, nausea and vomiting  Genitourinary: Negative for dysuria, flank pain, frequency, hematuria and urgency  Musculoskeletal: Negative for arthralgias, back pain, gait problem, joint swelling, myalgias, neck pain and neck stiffness  Skin: Negative for color change, rash and wound  Allergic/Immunologic: Negative for immunocompromised state  Neurological: Negative for dizziness, facial asymmetry and headaches  Hematological: Negative for adenopathy  Psychiatric/Behavioral: Negative for confusion and decreased concentration           Current Medications     Long-Term Prescriptions   Medication Sig Dispense Refill    levonorgestrel (MIRENA) 20 MCG/24HR IUD 1 each by Intrauterine route once         Current Allergies     Allergies as of 06/21/2018    (No Known Allergies)            The following portions of the patient's history were reviewed and updated as appropriate: allergies, current medications, past family history, past medical history, past social history, past surgical history and problem list   Past Medical History:   Diagnosis Date    Anxiety and depression     08 AUG 2017  RESOLVED    Diabetes insipidus ([address] )     RESOLVED 11 MAY 2017    Hypotension     16 MAR 2017  RESOLVED    Irritable bowel     Orthostasis     RESOLVED 16 MAR 2017    Palpitations     16 MAR 2017  RESOLVED     Past Surgical History:   Procedure Laterality Date    BREAST SURGERY      ENLARGEMENT    CERVICAL BIOPSY  W/ LOOP ELECTRODE EXCISION      LASIK      UTERINE FIBROID SURGERY       Social History     Social History    Marital status: /Civil Union     Spouse name: N/A    Number of children: N/A    Years of education: N/A     Occupational History    Not on file  Social History Main Topics    Smoking status: Current Every Day Smoker    Smokeless tobacco: Never Used    Alcohol use Yes      Comment: ocassional    Drug use: No    Sexual activity: Yes      Comment: IUD IN PLACE     Other Topics Concern    Not on file     Social History Narrative    DAILY CAFFEINE CONSUMPTION       Objective   /63   Pulse 90   Temp 99 3 °F (37 4 °C) (Tympanic)   Resp 18   Wt 70 4 kg (155 lb 3 3 oz)   SpO2 98%   BMI 25 83 kg/m²      Physical Exam     Physical Exam   Constitutional: She is oriented to person, place, and time  She appears well-developed and well-nourished  No distress  HENT:   Head: Normocephalic and atraumatic  Right Ear: Tympanic membrane and external ear normal    Left Ear: Tympanic membrane and external ear normal    Nose: Mucosal edema present  Right sinus exhibits no maxillary sinus tenderness and no frontal sinus tenderness  Left sinus exhibits no maxillary sinus tenderness and no frontal sinus tenderness  Mouth/Throat: Posterior oropharyngeal erythema present  No oropharyngeal exudate  Eyes: Conjunctivae and EOM are normal  Pupils are equal, round, and reactive to light  Right eye exhibits no discharge  Left eye exhibits no discharge  No scleral icterus  Neck: Normal range of motion  Neck supple  No JVD present  No tracheal deviation present  No thyromegaly present  Cardiovascular: Normal rate, regular rhythm and normal heart sounds  Exam reveals no gallop and no friction rub  No murmur heard    Pulmonary/Chest: Effort normal  No stridor  No respiratory distress  She has no decreased breath sounds  She has wheezes (resolved after cough) in the left lower field  She has no rhonchi  She has no rales  She exhibits no tenderness  Abdominal: Soft  Bowel sounds are normal  She exhibits no distension and no mass  There is no tenderness  There is no rebound and no guarding  Musculoskeletal: Normal range of motion  She exhibits no tenderness or deformity  Lymphadenopathy:     She has no cervical adenopathy  Neurological: She is alert and oriented to person, place, and time  She has normal reflexes  Coordination normal    Skin: Skin is warm and dry  No rash noted  She is not diaphoretic  No erythema  No pallor  Psychiatric: She has a normal mood and affect  Her behavior is normal  Judgment and thought content normal    Nursing note and vitals reviewed        Rafy Ro PA-C

## 2018-11-13 ENCOUNTER — OFFICE VISIT (OUTPATIENT)
Dept: FAMILY MEDICINE CLINIC | Facility: CLINIC | Age: 33
End: 2018-11-13
Payer: COMMERCIAL

## 2018-11-13 VITALS
BODY MASS INDEX: 26.59 KG/M2 | DIASTOLIC BLOOD PRESSURE: 62 MMHG | HEIGHT: 65 IN | TEMPERATURE: 97.9 F | WEIGHT: 159.6 LBS | SYSTOLIC BLOOD PRESSURE: 116 MMHG

## 2018-11-13 DIAGNOSIS — J40 BRONCHITIS: Primary | ICD-10-CM

## 2018-11-13 PROCEDURE — 99214 OFFICE O/P EST MOD 30 MIN: CPT | Performed by: PHYSICIAN ASSISTANT

## 2018-11-13 PROCEDURE — 3008F BODY MASS INDEX DOCD: CPT | Performed by: PHYSICIAN ASSISTANT

## 2018-11-13 RX ORDER — AZITHROMYCIN 250 MG/1
TABLET, FILM COATED ORAL
Qty: 6 TABLET | Refills: 0 | Status: SHIPPED | OUTPATIENT
Start: 2018-11-13 | End: 2018-11-17

## 2018-11-13 RX ORDER — BENZONATATE 100 MG/1
100 CAPSULE ORAL 3 TIMES DAILY PRN
Qty: 30 CAPSULE | Refills: 0 | Status: SHIPPED | OUTPATIENT
Start: 2018-11-13 | End: 2019-01-25 | Stop reason: ALTCHOICE

## 2018-11-13 NOTE — PROGRESS NOTES
Assessment/Plan:       Diagnoses and all orders for this visit:    Bronchitis  -     azithromycin (ZITHROMAX) 250 mg tablet; Take 2 tablets today then 1 tablet daily x 4 days  -     benzonatate (TESSALON PERLES) 100 mg capsule; Take 1 capsule (100 mg total) by mouth 3 (three) times a day as needed for cough          Subjective:      Patient ID: Vanessa Vidal is a 33 y o  female  Ronak Ocean is here today complaining of cough/cold symptoms x 2 months  She's tried OTC medications including Theraflu without relief  Cough   This is a new problem  The current episode started more than 1 month ago  The problem has been unchanged  The cough is productive of sputum  Associated symptoms include shortness of breath (mild with exertion) and wheezing  Pertinent negatives include no chest pain, chills, ear pain, eye redness, fever, headaches, myalgias, nasal congestion, postnasal drip, rash, rhinorrhea or sore throat  The symptoms are aggravated by exercise  Treatments tried: Theraflu  The treatment provided no relief  The following portions of the patient's history were reviewed and updated as appropriate:   She  has a past medical history of Anxiety and depression; Diabetes insipidus ([address] ); Hypotension; Irritable bowel; Orthostasis; and Palpitations  She There are no active problems to display for this patient  She  has a past surgical history that includes Cervical biopsy w/ loop electrode excision; Uterine fibroid surgery; LASIK; and Breast surgery  Her family history includes Diabetes in her mother; Heart disease in her maternal grandmother; Hypertension in her maternal grandfather, maternal grandmother, and mother; Other in her family  She  reports that she has been smoking  She has never used smokeless tobacco  She reports that she drinks alcohol  She reports that she does not use drugs    Current Outpatient Prescriptions   Medication Sig Dispense Refill    levonorgestrel (MIRENA) 20 MCG/24HR IUD 1 each by Intrauterine route once      azithromycin (ZITHROMAX) 250 mg tablet Take 2 tablets today then 1 tablet daily x 4 days 6 tablet 0    benzonatate (TESSALON PERLES) 100 mg capsule Take 1 capsule (100 mg total) by mouth 3 (three) times a day as needed for cough 30 capsule 0     No current facility-administered medications for this visit  Current Outpatient Prescriptions on File Prior to Visit   Medication Sig    levonorgestrel (MIRENA) 20 MCG/24HR IUD 1 each by Intrauterine route once    [DISCONTINUED] benzonatate (TESSALON PERLES) 100 mg capsule Take 1 capsule (100 mg total) by mouth 3 (three) times a day as needed for cough     No current facility-administered medications on file prior to visit  She has No Known Allergies       Review of Systems   Constitutional: Negative for activity change, appetite change, chills, diaphoresis, fatigue, fever and unexpected weight change  HENT: Positive for sneezing  Negative for congestion, ear pain, postnasal drip, rhinorrhea, sinus pain, sinus pressure, sore throat, tinnitus and voice change  Eyes: Negative for pain, redness and visual disturbance  Respiratory: Positive for cough, shortness of breath (mild with exertion) and wheezing  Negative for chest tightness  Cardiovascular: Negative for chest pain, palpitations and leg swelling  Gastrointestinal: Negative for abdominal pain, blood in stool, constipation, diarrhea, nausea and vomiting  Genitourinary: Negative for difficulty urinating, dysuria, frequency, hematuria and urgency  Musculoskeletal: Negative for arthralgias, back pain, gait problem, joint swelling, myalgias, neck pain and neck stiffness  Skin: Negative for color change, pallor, rash and wound  Neurological: Negative for dizziness, tremors, weakness, light-headedness and headaches  Psychiatric/Behavioral: Negative for dysphoric mood, self-injury, sleep disturbance and suicidal ideas  The patient is not nervous/anxious  Objective:      /62   Temp 97 9 °F (36 6 °C)   Ht 5' 5" (1 651 m)   Wt 72 4 kg (159 lb 9 6 oz)   BMI 26 56 kg/m²          Physical Exam   Constitutional: She is oriented to person, place, and time  She appears well-developed and well-nourished  No distress  HENT:   Head: Normocephalic and atraumatic  Right Ear: Hearing, external ear and ear canal normal  Tympanic membrane is injected  Left Ear: Hearing, external ear and ear canal normal  Tympanic membrane is injected  Mouth/Throat: Uvula is midline, oropharynx is clear and moist and mucous membranes are normal  No oropharyngeal exudate  Eyes: Conjunctivae are normal  Right eye exhibits no discharge  Left eye exhibits no discharge  No scleral icterus  Neck: Neck supple  Carotid bruit is not present  No thyromegaly present  Cardiovascular: Normal rate, regular rhythm and normal heart sounds  No murmur heard  Pulmonary/Chest: Effort normal  No respiratory distress  She has wheezes  She has rhonchi  Abdominal: Soft  Bowel sounds are normal  She exhibits no distension and no mass  There is no tenderness  There is no rebound and no guarding  Musculoskeletal: Normal range of motion  She exhibits no edema or tenderness  Lymphadenopathy:     She has no cervical adenopathy  Neurological: She is alert and oriented to person, place, and time  Skin: Skin is warm and dry  No rash noted  She is not diaphoretic  No erythema  Psychiatric: She has a normal mood and affect  Her behavior is normal  Judgment and thought content normal    Vitals reviewed

## 2019-01-25 ENCOUNTER — TELEPHONE (OUTPATIENT)
Dept: GASTROENTEROLOGY | Facility: CLINIC | Age: 34
End: 2019-01-25

## 2019-01-25 ENCOUNTER — OFFICE VISIT (OUTPATIENT)
Dept: FAMILY MEDICINE CLINIC | Facility: CLINIC | Age: 34
End: 2019-01-25
Payer: COMMERCIAL

## 2019-01-25 VITALS
SYSTOLIC BLOOD PRESSURE: 110 MMHG | WEIGHT: 152.8 LBS | DIASTOLIC BLOOD PRESSURE: 76 MMHG | HEIGHT: 65 IN | BODY MASS INDEX: 25.46 KG/M2

## 2019-01-25 DIAGNOSIS — K58.9 IRRITABLE BOWEL SYNDROME, UNSPECIFIED TYPE: ICD-10-CM

## 2019-01-25 DIAGNOSIS — K62.5 BRBPR (BRIGHT RED BLOOD PER RECTUM): Primary | ICD-10-CM

## 2019-01-25 DIAGNOSIS — Z23 NEED FOR INFLUENZA VACCINATION: ICD-10-CM

## 2019-01-25 DIAGNOSIS — K62.89 RECTAL PAIN: ICD-10-CM

## 2019-01-25 PROCEDURE — 99213 OFFICE O/P EST LOW 20 MIN: CPT | Performed by: PHYSICIAN ASSISTANT

## 2019-01-25 PROCEDURE — 90471 IMMUNIZATION ADMIN: CPT | Performed by: PHYSICIAN ASSISTANT

## 2019-01-25 PROCEDURE — 90686 IIV4 VACC NO PRSV 0.5 ML IM: CPT | Performed by: PHYSICIAN ASSISTANT

## 2019-01-25 NOTE — TELEPHONE ENCOUNTER
New patient that needs a sooner appt for rectal pain and rectal bleeding---her pcp asked that we get her in soon      Pleas assist

## 2019-01-25 NOTE — PROGRESS NOTES
Assessment/Plan:    Exam today was normal  Recommend continuing warm soaks if she found it beneficial and I will place GI referral for ASAP appointment  If any symptoms would worsen, she is to go to the ER  Diagnoses and all orders for this visit:    BRBPR (bright red blood per rectum)  -     Ambulatory referral to Gastroenterology; Future    Need for influenza vaccination  -     SYRINGE/SINGLE-DOSE VIAL: influenza vaccine, [id number], quadrivalent, 0 5 mL, preservative-free, for patients 3+ yr (FLUZONE)    Irritable bowel syndrome, unspecified type  -     Ambulatory referral to Gastroenterology; Future    Rectal pain  -     Ambulatory referral to Gastroenterology; Future          Subjective:      Patient ID: Jonny Person is a 29 y o  female  Juan Chery is here today complaining of rectal bleeding x 1 month  It usually occurs with bowel movements, but today, it occurred just during urination  She occasionally experiences pain/itching around her anus/rectum  She tends to be constipation  She's tried soaking in warm baths with some relief  Has a history of IBS  The following portions of the patient's history were reviewed and updated as appropriate:   She  has a past medical history of Anxiety and depression; Diabetes insipidus ([address] ); Hypotension; Irritable bowel; Orthostasis; and Palpitations  She   Patient Active Problem List    Diagnosis Date Noted    Irritable bowel syndrome 01/25/2019     She  has a past surgical history that includes Cervical biopsy w/ loop electrode excision; Uterine fibroid surgery; LASIK; and Breast surgery  Her family history includes Diabetes in her mother; Heart disease in her maternal grandmother; Hypertension in her maternal grandfather, maternal grandmother, and mother; Other in her family  She  reports that she has been smoking  She has never used smokeless tobacco  She reports that she drinks alcohol  She reports that she does not use drugs    Current Outpatient Prescriptions   Medication Sig Dispense Refill    levonorgestrel (MIRENA) 20 MCG/24HR IUD 1 each by Intrauterine route once       No current facility-administered medications for this visit  Current Outpatient Prescriptions on File Prior to Visit   Medication Sig    levonorgestrel (MIRENA) 20 MCG/24HR IUD 1 each by Intrauterine route once    [DISCONTINUED] benzonatate (TESSALON PERLES) 100 mg capsule Take 1 capsule (100 mg total) by mouth 3 (three) times a day as needed for cough     No current facility-administered medications on file prior to visit  She has No Known Allergies       Review of Systems   Constitutional: Negative for activity change, appetite change, chills, diaphoresis, fatigue, fever and unexpected weight change  HENT: Negative for congestion, ear pain, postnasal drip, rhinorrhea, sinus pain, sinus pressure, sneezing, sore throat, tinnitus and voice change  Eyes: Negative for pain, redness and visual disturbance  Respiratory: Negative for cough, chest tightness, shortness of breath and wheezing  Cardiovascular: Negative for chest pain, palpitations and leg swelling  Gastrointestinal: Positive for anal bleeding, blood in stool and constipation  Negative for abdominal pain, diarrhea, nausea and vomiting  Genitourinary: Negative for difficulty urinating, dysuria, frequency, hematuria and urgency  Musculoskeletal: Negative for arthralgias, back pain, gait problem, joint swelling, myalgias, neck pain and neck stiffness  Skin: Negative for color change, pallor, rash and wound  Neurological: Negative for dizziness, tremors, weakness, light-headedness and headaches  Psychiatric/Behavioral: Negative for dysphoric mood, self-injury, sleep disturbance and suicidal ideas  The patient is not nervous/anxious            Objective:      /76 (BP Location: Left arm, Patient Position: Sitting)   Ht 5' 5" (1 651 m)   Wt 69 3 kg (152 lb 12 8 oz)   BMI 25 43 kg/m² Physical Exam   Constitutional: She is oriented to person, place, and time  She appears well-developed and well-nourished  No distress  HENT:   Head: Normocephalic and atraumatic  Right Ear: Hearing, tympanic membrane, external ear and ear canal normal    Left Ear: Hearing, tympanic membrane, external ear and ear canal normal    Mouth/Throat: Uvula is midline, oropharynx is clear and moist and mucous membranes are normal  No oropharyngeal exudate  Eyes: Conjunctivae are normal  Right eye exhibits no discharge  Left eye exhibits no discharge  No scleral icterus  Neck: Neck supple  Carotid bruit is not present  No thyromegaly present  Cardiovascular: Normal rate, regular rhythm and normal heart sounds  No murmur heard  Pulmonary/Chest: Effort normal and breath sounds normal  No respiratory distress  She has no wheezes  Abdominal: Soft  Bowel sounds are normal  She exhibits no distension and no mass  There is no tenderness  There is no rebound and no guarding  Genitourinary: Rectal exam shows no external hemorrhoid, no fissure, no mass and no tenderness  Musculoskeletal: Normal range of motion  She exhibits no edema or tenderness  Lymphadenopathy:     She has no cervical adenopathy  Neurological: She is alert and oriented to person, place, and time  Skin: Skin is warm and dry  No rash noted  She is not diaphoretic  No erythema  Psychiatric: She has a normal mood and affect  Her behavior is normal  Judgment and thought content normal    Vitals reviewed

## 2019-01-29 ENCOUNTER — HOSPITAL ENCOUNTER (EMERGENCY)
Facility: HOSPITAL | Age: 34
Discharge: HOME | End: 2019-01-29
Attending: EMERGENCY MEDICINE | Admitting: EMERGENCY MEDICINE
Payer: COMMERCIAL

## 2019-01-29 VITALS
DIASTOLIC BLOOD PRESSURE: 80 MMHG | OXYGEN SATURATION: 100 % | SYSTOLIC BLOOD PRESSURE: 152 MMHG | HEART RATE: 76 BPM | BODY MASS INDEX: 26.3 KG/M2 | HEIGHT: 65 IN | RESPIRATION RATE: 20 BRPM | WEIGHT: 157.85 LBS | TEMPERATURE: 97.7 F

## 2019-01-29 DIAGNOSIS — I49.3 FREQUENT PVCS: Primary | ICD-10-CM

## 2019-01-29 LAB
ALBUMIN SERPL BCP-MCNC: 3.7 G/DL (ref 3.5–5)
ALP SERPL-CCNC: 67 U/L (ref 46–116)
ALT SERPL W P-5'-P-CCNC: 32 U/L (ref 12–78)
ANION GAP SERPL CALCULATED.3IONS-SCNC: 9 MMOL/L (ref 4–13)
AST SERPL W P-5'-P-CCNC: 22 U/L (ref 5–45)
ATRIAL RATE: 73 BPM
BASOPHILS # BLD AUTO: 0.05 THOUSANDS/ΜL (ref 0–0.1)
BASOPHILS NFR BLD AUTO: 1 % (ref 0–1)
BILIRUB SERPL-MCNC: 0.6 MG/DL (ref 0.2–1)
BILIRUB UR QL STRIP: NEGATIVE
BUN SERPL-MCNC: 11 MG/DL (ref 5–25)
CALCIUM SERPL-MCNC: 8.8 MG/DL (ref 8.3–10.1)
CHLORIDE SERPL-SCNC: 103 MMOL/L (ref 100–108)
CLARITY UR: CLEAR
CO2 SERPL-SCNC: 27 MMOL/L (ref 21–32)
COLOR UR: YELLOW
CREAT SERPL-MCNC: 0.86 MG/DL (ref 0.6–1.3)
EOSINOPHIL # BLD AUTO: 0.12 THOUSAND/ΜL (ref 0–0.61)
EOSINOPHIL NFR BLD AUTO: 1 % (ref 0–6)
ERYTHROCYTE [DISTWIDTH] IN BLOOD BY AUTOMATED COUNT: 12.5 % (ref 11.6–15.1)
EXT PREG TEST URINE: NEGATIVE
GFR SERPL CREATININE-BSD FRML MDRD: 90 ML/MIN/1.73SQ M
GLUCOSE SERPL-MCNC: 98 MG/DL (ref 65–140)
GLUCOSE UR STRIP-MCNC: NEGATIVE MG/DL
HCT VFR BLD AUTO: 45.1 % (ref 34.8–46.1)
HGB BLD-MCNC: 15.1 G/DL (ref 11.5–15.4)
HGB UR QL STRIP.AUTO: NEGATIVE
HOLD SPECIMEN: NORMAL
IMM GRANULOCYTES # BLD AUTO: 0.01 THOUSAND/UL (ref 0–0.2)
IMM GRANULOCYTES NFR BLD AUTO: 0 % (ref 0–2)
KETONES UR STRIP-MCNC: NEGATIVE MG/DL
LEUKOCYTE ESTERASE UR QL STRIP: NEGATIVE
LYMPHOCYTES # BLD AUTO: 3.81 THOUSANDS/ΜL (ref 0.6–4.47)
LYMPHOCYTES NFR BLD AUTO: 46 % (ref 14–44)
MAGNESIUM SERPL-MCNC: 1.9 MG/DL (ref 1.6–2.6)
MCH RBC QN AUTO: 31.8 PG (ref 26.8–34.3)
MCHC RBC AUTO-ENTMCNC: 33.5 G/DL (ref 31.4–37.4)
MCV RBC AUTO: 95 FL (ref 82–98)
MONOCYTES # BLD AUTO: 0.47 THOUSAND/ΜL (ref 0.17–1.22)
MONOCYTES NFR BLD AUTO: 6 % (ref 4–12)
NEUTROPHILS # BLD AUTO: 3.83 THOUSANDS/ΜL (ref 1.85–7.62)
NEUTS SEG NFR BLD AUTO: 46 % (ref 43–75)
NITRITE UR QL STRIP: NEGATIVE
NRBC BLD AUTO-RTO: 0 /100 WBCS
P AXIS: 58 DEGREES
PH UR STRIP.AUTO: 6 [PH] (ref 4.5–8)
PHOSPHATE SERPL-MCNC: 3.6 MG/DL (ref 2.7–4.5)
PLATELET # BLD AUTO: 316 THOUSANDS/UL (ref 149–390)
PMV BLD AUTO: 10.3 FL (ref 8.9–12.7)
POTASSIUM SERPL-SCNC: 3.1 MMOL/L (ref 3.5–5.3)
PR INTERVAL: 146 MS
PROT SERPL-MCNC: 7.4 G/DL (ref 6.4–8.2)
PROT UR STRIP-MCNC: NEGATIVE MG/DL
QRS AXIS: 89 DEGREES
QRSD INTERVAL: 98 MS
QT INTERVAL: 364 MS
QTC INTERVAL: 401 MS
RBC # BLD AUTO: 4.75 MILLION/UL (ref 3.81–5.12)
SODIUM SERPL-SCNC: 139 MMOL/L (ref 136–145)
SP GR UR STRIP.AUTO: 1.01 (ref 1–1.03)
T WAVE AXIS: 67 DEGREES
TSH SERPL DL<=0.05 MIU/L-ACNC: 2.7 UIU/ML (ref 0.36–3.74)
UROBILINOGEN UR QL STRIP.AUTO: 0.2 E.U./DL
VENTRICULAR RATE: 73 BPM
WBC # BLD AUTO: 8.29 THOUSAND/UL (ref 4.31–10.16)

## 2019-01-29 PROCEDURE — 99285 EMERGENCY DEPT VISIT HI MDM: CPT

## 2019-01-29 PROCEDURE — 85025 COMPLETE CBC W/AUTO DIFF WBC: CPT | Performed by: EMERGENCY MEDICINE

## 2019-01-29 PROCEDURE — 84443 ASSAY THYROID STIM HORMONE: CPT | Performed by: EMERGENCY MEDICINE

## 2019-01-29 PROCEDURE — 81003 URINALYSIS AUTO W/O SCOPE: CPT | Performed by: EMERGENCY MEDICINE

## 2019-01-29 PROCEDURE — 80053 COMPREHEN METABOLIC PANEL: CPT | Performed by: EMERGENCY MEDICINE

## 2019-01-29 PROCEDURE — 83735 ASSAY OF MAGNESIUM: CPT | Performed by: EMERGENCY MEDICINE

## 2019-01-29 PROCEDURE — 84100 ASSAY OF PHOSPHORUS: CPT | Performed by: EMERGENCY MEDICINE

## 2019-01-29 PROCEDURE — 93005 ELECTROCARDIOGRAM TRACING: CPT

## 2019-01-29 PROCEDURE — 96360 HYDRATION IV INFUSION INIT: CPT

## 2019-01-29 PROCEDURE — 93010 ELECTROCARDIOGRAM REPORT: CPT | Performed by: INTERNAL MEDICINE

## 2019-01-29 PROCEDURE — 81025 URINE PREGNANCY TEST: CPT | Performed by: EMERGENCY MEDICINE

## 2019-01-29 RX ORDER — POTASSIUM CHLORIDE 20 MEQ/1
40 TABLET, EXTENDED RELEASE ORAL ONCE
Status: COMPLETED | OUTPATIENT
Start: 2019-01-29 | End: 2019-01-29

## 2019-01-29 RX ADMIN — POTASSIUM CHLORIDE 40 MEQ: 1500 TABLET, EXTENDED RELEASE ORAL at 21:38

## 2019-01-29 RX ADMIN — SODIUM CHLORIDE 1000 ML: 0.9 INJECTION, SOLUTION INTRAVENOUS at 20:35

## 2019-01-30 NOTE — DISCHARGE INSTRUCTIONS
Premature Ventricular Contractions   WHAT YOU NEED TO KNOW:   Premature ventricular contractions (PVCs) are an interruption in your heart rhythm  They are caused by an early signal for your heart to pump  Your risk of PVCs increases when you drink alcohol or caffeine, or if you are fatigued or stressed  It is very important for you to follow up with your healthcare provider so the cause of your PVC can be diagnosed and treated  DISCHARGE INSTRUCTIONS:   Follow up with your healthcare provider as directed: You may need another EKG within the first 10 days and more testing for up to 12 months  Write down your questions so you remember to ask them during your visits  Medicines:   · Heart medicine  may be given to make your heart beat at a regular rate and rhythm  · Take your medicine as directed  Contact your healthcare provider if you think your medicine is not helping or if you have side effects  Tell him if you are allergic to any medicine  Keep a list of the medicines, vitamins, and herbs you take  Include the amounts, and when and why you take them  Bring the list or the pill bottles to follow-up visits  Carry your medicine list with you in case of an emergency  Contact your healthcare provider if:   · You still have symptoms after treatment, or your symptoms worsen  · You have questions or concerns about your condition or care  Return to the emergency department if:   · You have any of the following signs of a heart attack:      ¨ Squeezing, pressure, or pain in your chest that lasts longer than 5 minutes or returns    ¨ Discomfort or pain in your back, neck, jaw, stomach, or arm     ¨ Trouble breathing    ¨ Nausea or vomiting    ¨ Lightheadedness or a sudden cold sweat, especially with chest pain or trouble breathing    © 2017 [address] is for End User's use only and may not be sold, redistributed or otherwise used for commercial purposes   All illustrations and images included in [place] are the copyrighted property of [employer]  or [employer]  The above information is an  only  It is not intended as medical advice for individual conditions or treatments  Talk to your doctor, nurse or pharmacist before following any medical regimen to see if it is safe and effective for you  Premature Ventricular Contractions   WHAT YOU NEED TO KNOW:   What are premature ventricular contractions? Premature ventricular contractions (PVCs) are an interruption in your heart rhythm  They are caused by an early signal for your heart to pump  Your risk of PVCs increases when you drink alcohol or caffeine, or if you are fatigued or stressed  It is very important for you to follow up with your healthcare provider so the cause of your PVC can be diagnosed and treated  What are symptoms of PVCs? · A skipped heartbeat     · A fast heartbeat    · Chest pain     · Lightheadedness, dizziness, or faintness    · Tiredness with exercise or activity  How are PVCs diagnosed? Your healthcare provider will ask if you have a family history of heart problems  You may also need one or more of the following:  · An EKG  records your heart rhythm and how fast your heart beats  It is used to check for PVCs  · A Holter monitor  is a device that you wear for a period of time  It records how fast your heart beats, and if it beats in a regular pattern  You may need to wear it up to 72 hours  This will show how frequent your PVCs are during your normal daily activities  · An echocardiogram  (echo) is a type of ultrasound that shows the movement and blood vessels of your heart on a monitor  How are PVCs treated? Your treatment will depend on the cause of your PVC  You may need any of the following:  · Heart medicine  may be given to make your heart beat at a regular rate and rhythm  · Cardiac ablation  is a procedure that is used to treat an abnormal heart rhythm   Ask your healthcare provider for more information  When should I contact my healthcare provider? · You still have symptoms after treatment, or your symptoms worsen  · You have questions or concerns about your condition or care  When should I seek immediate care or call 911? · You have any of the following signs of a heart attack:      ¨ Squeezing, pressure, or pain in your chest that lasts longer than 5 minutes or returns    ¨ Discomfort or pain in your back, neck, jaw, stomach, or arm     ¨ Trouble breathing    ¨ Nausea or vomiting    ¨ Lightheadedness or a sudden cold sweat, especially with chest pain or trouble breathing    CARE AGREEMENT:   You have the right to help plan your care  Learn about your health condition and how it may be treated  Discuss treatment options with your caregivers to decide what care you want to receive  You always have the right to refuse treatment  The above information is an  only  It is not intended as medical advice for individual conditions or treatments  Talk to your doctor, nurse or pharmacist before following any medical regimen to see if it is safe and effective for you  © 2017 [facility] Information is for End User's use only and may not be sold, redistributed or otherwise used for commercial purposes  All illustrations and images included in CareNotes® are the copyrighted property of A [employer] M [employer]  or [employer] Carver

## 2019-01-30 NOTE — ED PROVIDER NOTES
Pt Name: Tanya Mar  MRN: [id number]  [place] 1985  Age/Sex: 29 y o  female  Date of evaluation: 1/29/2019  PCP: Samy Cunningham Dr    Chief Complaint   Patient presents with    Palpitations     Pt c/o having heart palpitations for the past month but getting worse today  HPI    Lyndsay Wallace presents to the Emergency Department complaining of palpitations  She feels them more and more frequently  She has been evaluated for this in the past but they have not been able to catch it on Holter or EKG  She uses her cell phone to monitor her HR and checks her pulse and can feel skipped or extra beats  HPI      Past Medical and Surgical History    Past Medical History:   Diagnosis Date    Anxiety and depression     08 AUG 2017  RESOLVED    Diabetes insipidus ([address] )     RESOLVED 11 MAY 2017    Hypotension     16 MAR 2017  RESOLVED    Irritable bowel     Orthostasis     RESOLVED 16 MAR 2017    Palpitations     16 MAR 2017  RESOLVED       Past Surgical History:   Procedure Laterality Date    BREAST SURGERY      ENLARGEMENT    CERVICAL BIOPSY  W/ LOOP ELECTRODE EXCISION      LASIK      UTERINE FIBROID SURGERY         Family History   Problem Relation Age of Onset    Diabetes Mother     Hypertension Mother     Heart disease Maternal Grandmother         CARDIAC DISORDER    Hypertension Maternal Grandmother     Hypertension Maternal Grandfather     Other Family         SKIN CONDITIONS PATERNAL GREAT GRANDFATHER       Social History   Substance Use Topics    Smoking status: Current Every Day Smoker     Packs/day: 0 50    Smokeless tobacco: Never Used    Alcohol use Yes      Comment: ocassional              Allergies    No Known Allergies    Home Medications    Prior to Admission medications    Medication Sig Start Date End Date Taking?  Authorizing Provider   levonorgestrel (MIRENA) 20 MCG/24HR IUD 1 each by Intrauterine route once    Historical Provider, MD Review of Systems    Review of Systems   Constitutional: Negative for activity change, appetite change, chills, diaphoresis, fatigue and fever  HENT: Negative for congestion, postnasal drip, rhinorrhea, sinus pressure, sneezing and sore throat  Eyes: Negative for pain and visual disturbance  Respiratory: Negative for cough, chest tightness and shortness of breath  Cardiovascular: Positive for palpitations  Negative for chest pain and leg swelling  Gastrointestinal: Negative for abdominal distention, abdominal pain, constipation, diarrhea, nausea and vomiting  Endocrine: Negative for polydipsia, polyphagia and polyuria  Genitourinary: Negative for decreased urine volume, difficulty urinating, dysuria, flank pain, frequency and hematuria  Musculoskeletal: Negative for arthralgias, gait problem, joint swelling and neck pain  Skin: Negative for pallor and rash  Allergic/Immunologic: Negative for immunocompromised state  Neurological: Negative for syncope, speech difficulty, weakness, light-headedness, numbness and headaches  All other systems reviewed and are negative  Physical Exam      ED Triage Vitals [01/29/19 2000]   Temperature Pulse Respirations Blood Pressure SpO2   97 7 °F (36 5 °C) 93 18 152/80 98 %      Temp Source Heart Rate Source Patient Position - Orthostatic VS BP Location FiO2 (%)   Temporal Monitor Lying Left arm --      Pain Score       No Pain               Physical Exam   Constitutional: She is oriented to person, place, and time  She appears well-developed and well-nourished  No distress  HENT:   Head: Normocephalic and atraumatic  Nose: Nose normal    Mouth/Throat: Oropharynx is clear and moist    Eyes: Pupils are equal, round, and reactive to light  Conjunctivae, EOM and lids are normal    Neck: Normal range of motion  Neck supple  Cardiovascular: Normal rate, regular rhythm and normal heart sounds  Exam reveals no gallop and no friction rub  No murmur heard  Pulmonary/Chest: Effort normal and breath sounds normal  No accessory muscle usage  No respiratory distress  She has no wheezes  She has no rales  Abdominal: Soft  She exhibits no distension  There is no tenderness  There is no rebound and no guarding  Neurological: She is alert and oriented to person, place, and time  No cranial nerve deficit or sensory deficit  Skin: Skin is warm and dry  No rash noted  She is not diaphoretic  No erythema  Psychiatric: She has a normal mood and affect  Her speech is normal and behavior is normal  Judgment and thought content normal    Nursing note and vitals reviewed  Assessment and Plan    Fidel Scott is a 29 y o  female who presents with palpitations  Physical examination unremarkable  Plan will be to perform diagnostic testing and treat symptomatically  MDM    Diagnostic Results    EKG:  normal EKG, normal sinus rhythm, frequent PVC's noted    EKG INTERPRETATION  EKG Interpretation      EKG interpreted by me  Interpretation by Marly Raimrez DO  EKG reviewed and interpreted independently      Labs:    Results for orders placed or performed during the hospital encounter of 01/29/19   CBC and differential   Result Value Ref Range    WBC 8 29 4 31 - 10 16 Thousand/uL    RBC 4 75 3 81 - 5 12 Million/uL    Hemoglobin 15 1 11 5 - 15 4 g/dL    Hematocrit 45 1 34 8 - 46 1 %    MCV 95 82 - 98 fL    MCH 31 8 26 8 - 34 3 pg    MCHC 33 5 31 4 - 37 4 g/dL    RDW 12 5 11 6 - 15 1 %    MPV 10 3 8 9 - 12 7 fL    Platelets 895 554 - 066 Thousands/uL    nRBC 0 /100 WBCs    Neutrophils Relative 46 43 - 75 %    Immat GRANS % 0 0 - 2 %    Lymphocytes Relative 46 (H) 14 - 44 %    Monocytes Relative 6 4 - 12 %    Eosinophils Relative 1 0 - 6 %    Basophils Relative 1 0 - 1 %    Neutrophils Absolute 3 83 1 85 - 7 62 Thousands/µL    Immature Grans Absolute 0 01 0 00 - 0 20 Thousand/uL    Lymphocytes Absolute 3 81 0 60 - 4 47 Thousands/µL Monocytes Absolute 0 47 0 17 - 1 22 Thousand/µL    Eosinophils Absolute 0 12 0 00 - 0 61 Thousand/µL    Basophils Absolute 0 05 0 00 - 0 10 Thousands/µL   Comprehensive metabolic panel   Result Value Ref Range    Sodium 139 136 - 145 mmol/L    Potassium 3 1 (L) 3 5 - 5 3 mmol/L    Chloride 103 100 - 108 mmol/L    CO2 27 21 - 32 mmol/L    ANION GAP 9 4 - 13 mmol/L    BUN 11 5 - 25 mg/dL    Creatinine 0 86 0 60 - 1 30 mg/dL    Glucose 98 65 - 140 mg/dL    Calcium 8 8 8 3 - 10 1 mg/dL    AST 22 5 - 45 U/L    ALT 32 12 - 78 U/L    Alkaline Phosphatase 67 46 - 116 U/L    Total Protein 7 4 6 4 - 8 2 g/dL    Albumin 3 7 3 5 - 5 0 g/dL    Total Bilirubin 0 60 0 20 - 1 00 mg/dL    eGFR 90 ml/min/1 73sq m   Magnesium   Result Value Ref Range    Magnesium 1 9 1 6 - 2 6 mg/dL   Phosphorus   Result Value Ref Range    Phosphorus 3 6 2 7 - 4 5 mg/dL   TSH   Result Value Ref Range    TSH 3RD GENERATON 2 698 0 358 - 3 740 uIU/mL   UA w Reflex to Microscopic w Reflex to Culture   Result Value Ref Range    Color, UA Yellow     Clarity, UA Clear     Specific [place], UA 1 010 1 003 - 1 030    pH, UA 6 0 4 5 - 8 0    Leukocytes, UA Negative Negative    Nitrite, UA Negative Negative    Protein, UA Negative Negative mg/dl    Glucose, UA Negative Negative mg/dl    Ketones, UA Negative Negative mg/dl    Urobilinogen, UA 0 2 0 2, 1 0 E U /dl E U /dl    Bilirubin, UA Negative Negative    Blood, UA Negative Negative   POCT pregnancy, urine   Result Value Ref Range    EXT PREG TEST UR (Ref: Negative) NEGATIVE    ECG 12 lead   Result Value Ref Range    Ventricular Rate 73 BPM    Atrial Rate 73 BPM    OK Interval 146 ms    QRSD Interval 98 ms    QT Interval 364 ms    QTC Interval 401 ms    P Axis 58 degrees    QRS Axis 89 degrees    T Wave Axis 67 degrees   Green / Black tube on hold   Result Value Ref Range    Extra Tube Hold for add-ons          All labs reviewed and utilized in the medical decision making process    Radiology:    No orders to display       All radiology studies independently viewed by me and interpreted by the radiologist     Procedure    Procedures      ED Course of Care and Re-Assessments        Medications   sodium chloride 0 9 % bolus 1,000 mL (0 mL Intravenous Stopped 1/29/19 2137)   potassium chloride (K-DUR,KLOR-CON) CR tablet 40 mEq (40 mEq Oral Given 1/29/19 2138)           FINAL IMPRESSION    Final diagnoses:   Frequent PVCs         DISPOSITION/PLAN    Time reflects when diagnosis was documented in both MDM as applicable and the Disposition within this note     Time User Action Codes Description Comment    1/29/2019  9:50 PM Melissa Estrella Add [I49 3] Frequent PVCs       ED Disposition     ED Disposition Condition Date/Time Comment    Discharge  Tue Jan 29, 2019  9:50 PM Chencho Mess discharge to home/self care  Condition at discharge: Good        Follow-up Information     Follow up With Specialties Details Why Contact Info    Emmy Goldberg DO Cardiology, Multidisciplinary Schedule an appointment as soon as possible for a visit  [place] [id number]  [phone]              PATIENT REFERRED TO:    Emmy Goldberg DO  [address] [facility] [address]  [phone]    Schedule an appointment as soon as possible for a visit        DISCHARGE MEDICATIONS:    Discharge Medication List as of 1/29/2019  9:53 PM      CONTINUE these medications which have NOT CHANGED    Details   levonorgestrel (MIRENA) 20 MCG/24HR IUD 1 each by Intrauterine route once, Historical Med             No discharge procedures on file           DO Lali Anderson DO  02/01/19 [id number]

## 2019-02-13 ENCOUNTER — OFFICE VISIT (OUTPATIENT)
Dept: GASTROENTEROLOGY | Facility: HOSPITAL | Age: 34
End: 2019-02-13
Payer: COMMERCIAL

## 2019-02-13 VITALS
SYSTOLIC BLOOD PRESSURE: 110 MMHG | WEIGHT: 155 LBS | HEIGHT: 65 IN | DIASTOLIC BLOOD PRESSURE: 62 MMHG | HEART RATE: 70 BPM | TEMPERATURE: 97.8 F | BODY MASS INDEX: 25.83 KG/M2

## 2019-02-13 DIAGNOSIS — K62.5 BRBPR (BRIGHT RED BLOOD PER RECTUM): ICD-10-CM

## 2019-02-13 DIAGNOSIS — K58.9 IRRITABLE BOWEL SYNDROME, UNSPECIFIED TYPE: ICD-10-CM

## 2019-02-13 DIAGNOSIS — K62.89 RECTAL PAIN: ICD-10-CM

## 2019-02-13 PROCEDURE — 99244 OFF/OP CNSLTJ NEW/EST MOD 40: CPT | Performed by: INTERNAL MEDICINE

## 2019-02-13 NOTE — PROGRESS NOTES
[facility] - Outpatient Consultation  Abelardo Pearson 29 y o  female MRN: [id number]  Encounter: [id number]          ASSESSMENT AND PLAN:      1  BRBPR (bright red blood per rectum)  -She has been having intermittent rectal bleeding mixed with the stool  Her symptoms started after she with had constipation for few days  We reviewed high-fiber diet  Start taking Metamucil 1 scoop daily  Her bleeding is likely secondary to internal hemorrhoids  I will schedule her for colonoscopy to rule out underlying colitis and malignancy  Bowel prep instructions given  2  Irritable bowel syndrome, unspecified type  -she is only having mild constipation  Her abdominal pain is stable  Advised her to continue eating high-fiber diet     ______________________________________________________________________    HPI:   55-year-old female with history of IBS here for evaluation of intermittent bright red blood per rectum  She has been having bright red blood per rectum for over 1 month  Patient reported that her symptoms started after she was episode of constipation while on keto diet  She reports small amount of bright red blood on the toilet paper and mixed with stool  She has underlying IBS but her symptoms are well controlled  She had similar presentation more than 10 years ago and underwent colonoscopy at that time  She denies weight loss, nausea, vomiting, family history of IBD or GI malignancy    REVIEW OF SYSTEMS:    CONSTITUTIONAL: Denies any fever, chills, rigors, and weight loss  HEENT: No earache or tinnitus  Denies hearing loss or visual disturbances  CARDIOVASCULAR: No chest pain or palpitations  RESPIRATORY: Denies any cough, hemoptysis, shortness of breath or dyspnea on exertion  GASTROINTESTINAL: As noted in the History of Present Illness  GENITOURINARY: No problems with urination  Denies any hematuria or dysuria  NEUROLOGIC: No dizziness or vertigo, denies headaches  MUSCULOSKELETAL: Denies any muscle or joint pain  SKIN: Denies skin rashes or itching  ENDOCRINE: Denies excessive thirst  Denies intolerance to heat or cold  PSYCHOSOCIAL: Denies depression or anxiety  Denies any recent memory loss  Historical Information   Past Medical History:   Diagnosis Date    Anxiety and depression     08 AUG 2017  RESOLVED    Diabetes insipidus ([address] )     RESOLVED 11 MAY 2017    Hypotension     16 MAR 2017  RESOLVED    Irritable bowel     Orthostasis     RESOLVED 16 MAR 2017    Palpitations     16 MAR 2017  RESOLVED     Past Surgical History:   Procedure Laterality Date    BREAST SURGERY      ENLARGEMENT    CERVICAL BIOPSY  W/ LOOP ELECTRODE EXCISION      LASIK      UPPER GASTROINTESTINAL ENDOSCOPY      UTERINE FIBROID SURGERY       Social History   Social History     Substance and Sexual Activity   Alcohol Use Yes    Comment: ocassional     Social History     Substance and Sexual Activity   Drug Use No     Social History     Tobacco Use   Smoking Status Current Every Day Smoker    Packs/day: 0 50   Smokeless Tobacco Never Used     Family History   Problem Relation Age of Onset    Diabetes Mother     Hypertension Mother     Heart disease Maternal Grandmother         CARDIAC DISORDER    Hypertension Maternal Grandmother     Hypertension Maternal Grandfather     Other Family         SKIN CONDITIONS PATERNAL GREAT GRANDFATHER       Meds/Allergies       Current Outpatient Medications:     levonorgestrel (MIRENA) 20 MCG/24HR IUD    No Known Allergies        Objective     Blood pressure 110/62, pulse 70, temperature 97 8 °F (36 6 °C), temperature source Tympanic, height 5' 5" (1 651 m), weight 70 3 kg (155 lb)  Body mass index is 25 79 kg/m²          PHYSICAL EXAM:      General Appearance:   Alert, cooperative, no distress   HEENT:   Normocephalic, atraumatic, anicteric      Neck:  Supple, symmetrical, trachea midline   Lungs:   Clear to auscultation bilaterally; no rales, rhonchi or wheezing; respirations unlabored    Heart[de-identified]   Regular rate and rhythm; no murmur, rub, or gallop  Abdomen:   Soft, non-tender, non-distended; normal bowel sounds; no masses, no organomegaly    Genitalia:   Deferred    Rectal:   Deferred    Extremities:  No cyanosis, clubbing or edema    Pulses:  2+ and symmetric    Skin:  No jaundice, rashes, or lesions    Lymph nodes:  No palpable cervical lymphadenopathy        Lab Results:   No visits with results within 1 Day(s) from this visit     Latest known visit with results is:   Admission on 01/29/2019, Discharged on 01/29/2019   Component Date Value    WBC 01/29/2019 8 29     RBC 01/29/2019 4 75     Hemoglobin 01/29/2019 15 1     Hematocrit 01/29/2019 45 1     MCV 01/29/2019 95     MCH 01/29/2019 31 8     MCHC 01/29/2019 33 5     RDW 01/29/2019 12 5     MPV 01/29/2019 10 3     Platelets 12/28/2152 316     nRBC 01/29/2019 0     Neutrophils Relative 01/29/2019 46     Immat GRANS % 01/29/2019 0     Lymphocytes Relative 01/29/2019 46*    Monocytes Relative 01/29/2019 6     Eosinophils Relative 01/29/2019 1     Basophils Relative 01/29/2019 1     Neutrophils Absolute 01/29/2019 3 83     Immature Grans Absolute 01/29/2019 0 01     Lymphocytes Absolute 01/29/2019 3 81     Monocytes Absolute 01/29/2019 0 47     Eosinophils Absolute 01/29/2019 0 12     Basophils Absolute 01/29/2019 0 05     Sodium 01/29/2019 139     Potassium 01/29/2019 3 1*    Chloride 01/29/2019 103     CO2 01/29/2019 27     ANION GAP 01/29/2019 9     BUN 01/29/2019 11     Creatinine 01/29/2019 0 86     Glucose 01/29/2019 98     Calcium 01/29/2019 8 8     AST 01/29/2019 22     ALT 01/29/2019 32     Alkaline Phosphatase 01/29/2019 67     Total Protein 01/29/2019 7 4     Albumin 01/29/2019 3 7     Total Bilirubin 01/29/2019 0 60     eGFR 01/29/2019 90     Magnesium 01/29/2019 1 9     Phosphorus 01/29/2019 3 6     TSH 3RD GENERATON 01/29/2019 2 698     EXT PREG TEST UR (Ref: N* 01/29/2019 NEGATIVE     Color, UA 01/29/2019 Yellow     Clarity, UA 01/29/2019 Clear     Specific Gravity, UA 01/29/2019 1 010     pH, UA 01/29/2019 6 0     Leukocytes, UA 01/29/2019 Negative     Nitrite, UA 01/29/2019 Negative     Protein, UA 01/29/2019 Negative     Glucose, UA 01/29/2019 Negative     Ketones, UA 01/29/2019 Negative     Urobilinogen, UA 01/29/2019 0 2     Bilirubin, UA 01/29/2019 Negative     Blood, UA 01/29/2019 Negative     Extra Tube 01/29/2019 Hold for add-ons       Ventricular Rate 01/29/2019 73     Atrial Rate 01/29/2019 73     MN Interval 01/29/2019 146     QRSD Interval 01/29/2019 98     QT Interval 01/29/2019 364     QTC Interval 01/29/2019 401     P Axis 01/29/2019 58     QRS Axis 01/29/2019 89     T Wave Axis 01/29/2019 67          Radiology Results:   Her recent CT scan of abdomen and pelvis was unremarkable

## 2019-03-20 ENCOUNTER — TELEPHONE (OUTPATIENT)
Dept: GASTROENTEROLOGY | Facility: AMBULARY SURGERY CENTER | Age: 34
End: 2019-03-20

## 2020-01-02 ENCOUNTER — ANNUAL EXAM (OUTPATIENT)
Dept: OBGYN CLINIC | Facility: CLINIC | Age: 35
End: 2020-01-02
Payer: COMMERCIAL

## 2020-01-02 VITALS
WEIGHT: 154.8 LBS | DIASTOLIC BLOOD PRESSURE: 76 MMHG | BODY MASS INDEX: 25.79 KG/M2 | SYSTOLIC BLOOD PRESSURE: 120 MMHG | HEIGHT: 65 IN

## 2020-01-02 DIAGNOSIS — N64.4 PAIN OF BOTH BREASTS: Primary | ICD-10-CM

## 2020-01-02 DIAGNOSIS — Z01.419 ENCOUNTER FOR GYNECOLOGICAL EXAMINATION WITHOUT ABNORMAL FINDING: ICD-10-CM

## 2020-01-02 DIAGNOSIS — Z11.51 SCREENING FOR HPV (HUMAN PAPILLOMAVIRUS): ICD-10-CM

## 2020-01-02 PROCEDURE — 99395 PREV VISIT EST AGE 18-39: CPT | Performed by: NURSE PRACTITIONER

## 2020-01-02 NOTE — ASSESSMENT & PLAN NOTE
Normal findings on routine gyn exam  Advised monthly SBE, annual CBE and baseline screening mammo at age 37  Reviewed ASCCP guidelines and recommended pap with cotesting q3 yrs for this low risk patient-obtained today  STI testing was offered and the patient desires GC/CT-declined blood work  The patient desires to continue current contraceptive method-Mirena  Diet/activity recommendations:  Encouraged daily Ca++ and vitamin D intake as well as daily weight bearing exercise for promotion of bone health  RTO in one year or sooner PRN

## 2020-01-03 NOTE — PROGRESS NOTES
Assessment/Plan:    Encounter for gynecological examination without abnormal finding  Normal findings on routine gyn exam  Advised monthly SBE, annual CBE and baseline screening mammo at age 37  Reviewed ASCCP guidelines and recommended pap with cotesting q3 yrs for this low risk patient-obtained today  STI testing was offered and the patient desires GC/CT-declined blood work  The patient desires to continue current contraceptive method-Mirena  Diet/activity recommendations:  Encouraged daily Ca++ and vitamin D intake as well as daily weight bearing exercise for promotion of bone health  RTO in one year or sooner PRN  Pain of both breasts  Pain x 2 months-more on left than right  Will get bilateral ultrasound of breasts  Diagnoses and all orders for this visit:    Pain of both breasts  -     US BREAST BILATERAL LIMITED (DIAGNOSTIC); Future    Encounter for gynecological examination without abnormal finding  -     Thinprep Pap and HR HPV DNA    Screening for HPV (human papillomavirus)  -     Thinprep Pap and HR HPV DNA        Robert Noon  1985      CC:  Yearly exam     Dayna Louis is a  36 y o  female here for yearly exam  She denies abdominal/pelvic pain, abnormal vaginal discharge or bladder/bowel dysfunction  She does report bilateral breast pain and a history of ovarian cysts and breast augmentation  Her cycles are regular, not heavy or painful  Sexual activity: She is sexually active without pain, bleeding or dryness  Contraception: She uses Mirena placed 10/5/17  for contraception  STD testing:  She does want STD testing today       Last Pap 11/16 negative   SBE sometimes       Family hx of breast cancer: denies  Family hx of ovarian cancer:denies  Family hx of colon cancer: denies       Current Outpatient Medications:     levonorgestrel (MIRENA) 20 MCG/24HR IUD, 1 each by Intrauterine route once, Disp: , Rfl:   Social History     Socioeconomic History    Marital status: /Civil [employer]     Spouse name: Not on file    Number of children: Not on file    Years of education: Not on file    Highest education level: Not on file   Occupational History    Not on file   Social Needs    Financial resource strain: Not on file    Food insecurity:     Worry: Not on file     Inability: Not on file    Transportation needs:     Medical: Not on file     Non-medical: Not on file   Tobacco Use    Smoking status: Current Every Day Smoker     Packs/day: 0 50    Smokeless tobacco: Never Used   Substance and Sexual Activity    Alcohol use: Yes     Comment: ocassional    Drug use: No    Sexual activity: Yes     Comment: IUD IN PLACE   Lifestyle    Physical activity:     Days per week: Not on file     Minutes per session: Not on file    Stress: Not on file   Relationships    Social connections:     Talks on phone: Not on file     Gets together: Not on file     Attends Rastafari service: Not on file     Active member of club or organization: Not on file     Attends meetings of clubs or organizations: Not on file     Relationship status: Not on file    Intimate partner violence:     Fear of current or ex partner: Not on file     Emotionally abused: Not on file     Physically abused: Not on file     Forced sexual activity: Not on file   Other Topics Concern    Not on file   Social History Narrative    DAILY CAFFEINE CONSUMPTION     Family History   Problem Relation Age of Onset    Diabetes Mother     Hypertension Mother     Heart disease Maternal Grandmother         CARDIAC DISORDER    Hypertension Maternal Grandmother     Hypertension Maternal Grandfather     Other Family         SKIN CONDITIONS PATERNAL GREAT GRANDFATHER      Past Medical History:   Diagnosis Date    Anxiety and depression     08 AUG 2017  RESOLVED    Diabetes insipidus ([address] )     RESOLVED 11 MAY 2017    Hypotension     16 MAR 2017  RESOLVED    Irritable bowel     Orthostasis     RESOLVED 16 MAR 2017    Palpitations     16 MAR 2017  RESOLVED        Review of Systems   Constitutional: Negative for appetite change, fatigue and unexpected weight change  Respiratory: Negative for shortness of breath  Cardiovascular: Negative for chest pain and leg swelling  Gastrointestinal: Negative for abdominal pain  Endocrine: Negative for cold intolerance and heat intolerance  Breasts:  Negative for  Masses  Positive for breast tenderness -L>R   Genitourinary: Negative  Negative for dyspareunia, dysuria, flank pain, frequency, genital sores, hematuria, menstrual problem and pelvic pain  Musculoskeletal: Negative for back pain  Neurological: Negative for headaches  O:  Blood pressure 120/76, height 5' 5" (1 651 m), weight 70 2 kg (154 lb 12 8 oz)  Patient appears well and is not in distress  ROM normal   Neck is supple without masses  Normal thyroid  Heart regular rate and rhythm  Lungs CTA bilaterally   Breasts are symmetrical without mass, nipple discharge, skin changes or adenopathy  + for tenderness on palpation -more on left than right   + scars noted bilaterally  Abdomen is soft and nontender without masses  External genitals are normal without lesions or rashes  Urethral meatus and urethra are normal  Bladder is normal to palpation  Vagina is normal without discharge or bleeding  Cervix is normal without discharge or lesion  Mirena strings visualized   Uterus is normal, mobile, nontender without palpable mass  Adnexa are normal, nontender, without palpable mass     Skin warm and dry   Capillary refill < 2 seconds  Alert and oriented x 3 with normal affect

## 2020-01-07 LAB
CLINICAL INFO: NORMAL
CYTO CVX: NORMAL
DATE PREVIOUS BX: NORMAL
HPV I/H RISK 1 DNA CVX QL PROBE+SIG AMP: NOT DETECTED
LMP START DATE: NORMAL
SL AMB PREV. PAP:: NORMAL
SPECIMEN SOURCE CVX/VAG CYTO: NORMAL

## 2020-01-09 ENCOUNTER — TELEPHONE (OUTPATIENT)
Dept: OBGYN CLINIC | Facility: CLINIC | Age: 35
End: 2020-01-09

## 2020-01-23 ENCOUNTER — OFFICE VISIT (OUTPATIENT)
Dept: FAMILY MEDICINE CLINIC | Facility: CLINIC | Age: 35
End: 2020-01-23
Payer: COMMERCIAL

## 2020-01-23 VITALS
OXYGEN SATURATION: 96 % | WEIGHT: 157.2 LBS | HEIGHT: 65 IN | DIASTOLIC BLOOD PRESSURE: 70 MMHG | TEMPERATURE: 97.6 F | HEART RATE: 92 BPM | BODY MASS INDEX: 26.19 KG/M2 | SYSTOLIC BLOOD PRESSURE: 96 MMHG

## 2020-01-23 DIAGNOSIS — R68.89 FLU-LIKE SYMPTOMS: Primary | ICD-10-CM

## 2020-01-23 DIAGNOSIS — Z71.6 TOBACCO ABUSE COUNSELING: ICD-10-CM

## 2020-01-23 DIAGNOSIS — Z13.31 DEPRESSION SCREENING NEGATIVE: ICD-10-CM

## 2020-01-23 PROCEDURE — 87631 RESP VIRUS 3-5 TARGETS: CPT | Performed by: PHYSICIAN ASSISTANT

## 2020-01-23 PROCEDURE — 3008F BODY MASS INDEX DOCD: CPT | Performed by: PHYSICIAN ASSISTANT

## 2020-01-23 PROCEDURE — 99214 OFFICE O/P EST MOD 30 MIN: CPT | Performed by: PHYSICIAN ASSISTANT

## 2020-01-23 NOTE — LETTER
January 23, 2020     Patient: Duarte Herrera   YOB: 1985   Date of Visit: 1/23/2020       To Whom it May Concern:    Duarte Herrera is under my professional care  She was seen in my office on 1/23/2020  She may return to work on 1/27/2020  Please also excuse from work 1/22/2020  If you have any questions or concerns, please don't hesitate to call           Sincerely,          Oz Kinney PA-C        CC: No Recipients

## 2020-01-23 NOTE — PROGRESS NOTES
Assessment/Plan:    Problem List Items Addressed This Visit     None      Visit Diagnoses     Flu-like symptoms    -  Primary    Relevant Orders    Influenza A/B and RSV PCR    BMI 26 0-26 9,adult        Depression screening negative        Tobacco abuse counseling               Diagnoses and all orders for this visit:    Flu-like symptoms  -     Influenza A/B and RSV PCR; Future  -     Influenza A/B and RSV PCR    BMI 26 0-26 9,adult    Depression screening negative    Tobacco abuse counseling        Encouraged OTC supportive care  Subjective:      Patient ID: Irish Ormond is a 36 y o  female  Aguilar Dangelo is here today complaining of flu-like symptoms which started 4-5 days ago  Symptoms include sneezing, body aches, chills, cough, and runny nose  Denies fever  The following portions of the patient's history were reviewed and updated as appropriate:   She has a past medical history of Anxiety and depression, Diabetes insipidus ([address] ), Hypotension, Irritable bowel, Orthostasis, and Palpitations  ,  does not have any pertinent problems on file  ,   has a past surgical history that includes Cervical biopsy w/ loop electrode excision; Uterine fibroid surgery; LASIK; Breast surgery; and Upper gastrointestinal endoscopy  ,  family history includes Diabetes in her mother; Heart disease in her maternal grandmother; Hypertension in her maternal grandfather, maternal grandmother, and mother; Other in her family  ,   reports that she has been smoking  She has been smoking about 0 50 packs per day  She has never used smokeless tobacco  She reports that she drinks alcohol  She reports that she does not use drugs  ,  has No Known Allergies     Current Outpatient Medications   Medication Sig Dispense Refill    levonorgestrel (MIRENA) 20 MCG/24HR IUD 1 each by Intrauterine route once       No current facility-administered medications for this visit  Review of Systems   Constitutional: Positive for chills   Negative for activity change, appetite change, diaphoresis, fatigue, fever and unexpected weight change  HENT: Positive for congestion, postnasal drip, rhinorrhea and sneezing  Negative for ear pain, sinus pressure, sinus pain, sore throat, tinnitus and voice change  Eyes: Negative for pain, redness and visual disturbance  Respiratory: Positive for cough  Negative for chest tightness, shortness of breath and wheezing  Cardiovascular: Negative for chest pain, palpitations and leg swelling  Gastrointestinal: Negative for abdominal pain, blood in stool, constipation, diarrhea, nausea and vomiting  Genitourinary: Negative for difficulty urinating, dysuria, frequency, hematuria and urgency  Musculoskeletal: Positive for arthralgias and myalgias  Negative for back pain, gait problem, joint swelling, neck pain and neck stiffness  Skin: Negative for color change, pallor, rash and wound  Neurological: Negative for dizziness, tremors, weakness, light-headedness and headaches  Psychiatric/Behavioral: Negative for dysphoric mood, self-injury, sleep disturbance and suicidal ideas  The patient is not nervous/anxious  Objective:  Vitals:    01/23/20 0805   BP: 96/70   BP Location: Left arm   Patient Position: Sitting   Pulse: 92   Temp: 97 6 °F (36 4 °C)   SpO2: 96%   Weight: 71 3 kg (157 lb 3 2 oz)   Height: 5' 5" (1 651 m)     Body mass index is 26 16 kg/m²  Physical Exam   Constitutional: She is oriented to person, place, and time  She appears well-developed and well-nourished  No distress  HENT:   Head: Normocephalic and atraumatic  Right Ear: Hearing, tympanic membrane, external ear and ear canal normal  Tympanic membrane is not injected  Left Ear: Hearing, tympanic membrane, external ear and ear canal normal  Tympanic membrane is not injected  Nose: Mucosal edema and rhinorrhea present     Mouth/Throat: Uvula is midline, oropharynx is clear and moist and mucous membranes are normal  No oropharyngeal exudate, posterior oropharyngeal edema, posterior oropharyngeal erythema or tonsillar abscesses  No tonsillar exudate  Eyes: Conjunctivae are normal  Right eye exhibits no discharge  Left eye exhibits no discharge  No scleral icterus  Neck: Neck supple  Carotid bruit is not present  No thyromegaly present  Cardiovascular: Normal rate, regular rhythm and normal heart sounds  No murmur heard  Pulmonary/Chest: Effort normal and breath sounds normal  No respiratory distress  She has no wheezes  Abdominal: Soft  Bowel sounds are normal  She exhibits no distension and no mass  There is no tenderness  There is no rebound and no guarding  Musculoskeletal: Normal range of motion  She exhibits no edema or tenderness  Neurological: She is alert and oriented to person, place, and time  Skin: Skin is warm and dry  No rash noted  She is not diaphoretic  No erythema  Psychiatric: She has a normal mood and affect  Her behavior is normal  Judgment and thought content normal    Vitals reviewed  BMI Counseling: Body mass index is 26 16 kg/m²  The BMI is above normal  Nutrition recommendations include reducing portion sizes, decreasing overall calorie intake and 3-5 servings of fruits/vegetables daily  Exercise recommendations include exercising 3-5 times per week  PHQ-9 Depression Screening    PHQ-9:    Frequency of the following problems over the past two weeks:       Little interest or pleasure in doing things:  0 - not at all  Feeling down, depressed, or hopeless:  0 - not at all  PHQ-2 Score:  0         Tobacco Cessation Counseling: Tobacco cessation counseling and education was provided  The patient is sincerely urged to quit consumption of tobacco  She is not ready to quit tobacco  The numerous health risks of tobacco consumption were discussed   If she decides to quit, there are a number of helpful adjunctive aids, and she can see me to discuss nicotine replacement therapy, chantix, or bupropion anytime in the future

## 2020-01-24 LAB
FLUAV RNA NPH QL NAA+PROBE: DETECTED
FLUBV RNA NPH QL NAA+PROBE: ABNORMAL
RSV RNA NPH QL NAA+PROBE: ABNORMAL

## 2020-01-31 ENCOUNTER — HOSPITAL ENCOUNTER (OUTPATIENT)
Dept: MAMMOGRAPHY | Facility: HOSPITAL | Age: 35
Discharge: HOME | End: 2020-01-31
Payer: COMMERCIAL

## 2020-01-31 ENCOUNTER — HOSPITAL ENCOUNTER (OUTPATIENT)
Dept: ULTRASOUND IMAGING | Facility: HOSPITAL | Age: 35
Discharge: HOME | End: 2020-01-31
Payer: COMMERCIAL

## 2020-01-31 VITALS — WEIGHT: 157 LBS | BODY MASS INDEX: 26.16 KG/M2 | HEIGHT: 65 IN

## 2020-01-31 DIAGNOSIS — N64.4 PAIN OF BOTH BREASTS: ICD-10-CM

## 2020-01-31 PROCEDURE — G0279 TOMOSYNTHESIS, MAMMO: HCPCS

## 2020-01-31 PROCEDURE — 77066 DX MAMMO INCL CAD BI: CPT

## 2020-02-05 ENCOUNTER — TELEPHONE (OUTPATIENT)
Dept: FAMILY MEDICINE CLINIC | Facility: CLINIC | Age: 35
End: 2020-02-05

## 2020-02-05 ENCOUNTER — TELEPHONE (OUTPATIENT)
Dept: OBGYN CLINIC | Facility: CLINIC | Age: 35
End: 2020-02-05

## 2020-02-05 NOTE — TELEPHONE ENCOUNTER
Pt contacted and advised as directed  Pt stated no longer has pain but will be sure to call for referral if it returns

## 2020-02-05 NOTE — TELEPHONE ENCOUNTER
Patient was diag with the flu 2 weeks ago, still very congested and interfering with her job  Nothing OTC is helping  Cant come in for an appt due to work hours  Asking if there is anything you recommend or able to call in  Please advise

## 2020-02-05 NOTE — TELEPHONE ENCOUNTER
Please notify her that her mammo was negative    If breast pain persists, she can see a breast specialist   Repeat mammo 1 year

## 2020-06-04 ENCOUNTER — TELEPHONE (OUTPATIENT)
Dept: FAMILY MEDICINE CLINIC | Facility: CLINIC | Age: 35
End: 2020-06-04

## 2021-02-24 ENCOUNTER — TELEPHONE (OUTPATIENT)
Dept: FAMILY MEDICINE CLINIC | Facility: CLINIC | Age: 36
End: 2021-02-24